# Patient Record
Sex: MALE | Race: BLACK OR AFRICAN AMERICAN | Employment: OTHER
[De-identification: names, ages, dates, MRNs, and addresses within clinical notes are randomized per-mention and may not be internally consistent; named-entity substitution may affect disease eponyms.]

---

## 2017-02-02 ENCOUNTER — TELEPHONE (OUTPATIENT)
Dept: FAMILY MEDICINE CLINIC | Facility: CLINIC | Age: 62
End: 2017-02-02

## 2017-02-06 ENCOUNTER — TELEPHONE (OUTPATIENT)
Dept: FAMILY MEDICINE CLINIC | Facility: CLINIC | Age: 62
End: 2017-02-06

## 2017-02-07 ENCOUNTER — CARE COORDINATION (OUTPATIENT)
Dept: CARE COORDINATION | Facility: CLINIC | Age: 62
End: 2017-02-07

## 2017-02-07 ENCOUNTER — OFFICE VISIT (OUTPATIENT)
Dept: FAMILY MEDICINE CLINIC | Facility: CLINIC | Age: 62
End: 2017-02-07

## 2017-02-07 VITALS — DIASTOLIC BLOOD PRESSURE: 80 MMHG | SYSTOLIC BLOOD PRESSURE: 120 MMHG

## 2017-02-07 DIAGNOSIS — M86.271 SUBACUTE OSTEOMYELITIS OF RIGHT FOOT (HCC): Primary | ICD-10-CM

## 2017-02-07 PROCEDURE — G8419 CALC BMI OUT NRM PARAM NOF/U: HCPCS | Performed by: FAMILY MEDICINE

## 2017-02-07 PROCEDURE — G8484 FLU IMMUNIZE NO ADMIN: HCPCS | Performed by: FAMILY MEDICINE

## 2017-02-07 PROCEDURE — G8598 ASA/ANTIPLAT THER USED: HCPCS | Performed by: FAMILY MEDICINE

## 2017-02-07 PROCEDURE — G8427 DOCREV CUR MEDS BY ELIG CLIN: HCPCS | Performed by: FAMILY MEDICINE

## 2017-02-07 PROCEDURE — 99214 OFFICE O/P EST MOD 30 MIN: CPT | Performed by: FAMILY MEDICINE

## 2017-02-07 PROCEDURE — 1036F TOBACCO NON-USER: CPT | Performed by: FAMILY MEDICINE

## 2017-02-07 PROCEDURE — 3017F COLORECTAL CA SCREEN DOC REV: CPT | Performed by: FAMILY MEDICINE

## 2017-02-07 RX ORDER — ONDANSETRON HYDROCHLORIDE 4 MG/5ML
SOLUTION ORAL
COMMUNITY
End: 2017-09-21

## 2017-02-07 RX ORDER — OXYCODONE HYDROCHLORIDE 5 MG/1
5 TABLET ORAL
COMMUNITY
End: 2017-04-25 | Stop reason: ALTCHOICE

## 2017-02-07 RX ORDER — CEFTRIAXONE 2 G/1
INJECTION, POWDER, FOR SOLUTION INTRAMUSCULAR; INTRAVENOUS
COMMUNITY
End: 2017-07-31

## 2017-02-07 RX ORDER — METOCLOPRAMIDE 5 MG/1
5 TABLET ORAL DAILY
COMMUNITY

## 2017-02-07 RX ORDER — CALCITRIOL 0.5 UG/1
0.5 CAPSULE, LIQUID FILLED ORAL DAILY
Qty: 30 CAPSULE | Refills: 5 | Status: SHIPPED | OUTPATIENT
Start: 2017-02-07

## 2017-02-07 ASSESSMENT — ENCOUNTER SYMPTOMS
EYE REDNESS: 0
SHORTNESS OF BREATH: 0
CONSTIPATION: 0
DIARRHEA: 0
VOMITING: 0
EYE DISCHARGE: 0
COUGH: 0
ABDOMINAL PAIN: 0
BLOOD IN STOOL: 0
TROUBLE SWALLOWING: 0
NAUSEA: 0

## 2017-02-08 ENCOUNTER — CARE COORDINATION (OUTPATIENT)
Dept: CARE COORDINATION | Facility: CLINIC | Age: 62
End: 2017-02-08

## 2017-02-09 ENCOUNTER — TELEPHONE (OUTPATIENT)
Dept: FAMILY MEDICINE CLINIC | Facility: CLINIC | Age: 62
End: 2017-02-09

## 2017-02-09 ENCOUNTER — CARE COORDINATION (OUTPATIENT)
Dept: CARE COORDINATION | Facility: CLINIC | Age: 62
End: 2017-02-09

## 2017-02-09 DIAGNOSIS — M86.271 SUBACUTE OSTEOMYELITIS OF RIGHT FOOT (HCC): Primary | ICD-10-CM

## 2017-02-10 ENCOUNTER — CARE COORDINATION (OUTPATIENT)
Dept: CARE COORDINATION | Facility: CLINIC | Age: 62
End: 2017-02-10

## 2017-02-13 ENCOUNTER — CARE COORDINATION (OUTPATIENT)
Dept: CARE COORDINATION | Facility: CLINIC | Age: 62
End: 2017-02-13

## 2017-02-24 ENCOUNTER — CARE COORDINATION (OUTPATIENT)
Dept: CARE COORDINATION | Facility: CLINIC | Age: 62
End: 2017-02-24

## 2017-02-27 ENCOUNTER — CARE COORDINATION (OUTPATIENT)
Dept: CARE COORDINATION | Facility: CLINIC | Age: 62
End: 2017-02-27

## 2017-02-28 ENCOUNTER — CARE COORDINATION (OUTPATIENT)
Dept: CARE COORDINATION | Facility: CLINIC | Age: 62
End: 2017-02-28

## 2017-03-01 ENCOUNTER — CARE COORDINATION (OUTPATIENT)
Dept: CARE COORDINATION | Facility: CLINIC | Age: 62
End: 2017-03-01

## 2017-03-02 ENCOUNTER — CARE COORDINATION (OUTPATIENT)
Dept: CARE COORDINATION | Facility: CLINIC | Age: 62
End: 2017-03-02

## 2017-03-14 ENCOUNTER — CARE COORDINATION (OUTPATIENT)
Dept: CARE COORDINATION | Age: 62
End: 2017-03-14

## 2017-03-14 RX ORDER — LIDOCAINE AND PRILOCAINE 25; 25 MG/G; MG/G
CREAM TOPICAL
Qty: 30 G | Refills: 5 | Status: SHIPPED | OUTPATIENT
Start: 2017-03-14 | End: 2018-01-01 | Stop reason: SDUPTHER

## 2017-03-15 ENCOUNTER — CARE COORDINATION (OUTPATIENT)
Dept: CARE COORDINATION | Age: 62
End: 2017-03-15

## 2017-03-27 ENCOUNTER — TELEPHONE (OUTPATIENT)
Dept: FAMILY MEDICINE CLINIC | Age: 62
End: 2017-03-27

## 2017-03-28 ENCOUNTER — OFFICE VISIT (OUTPATIENT)
Dept: FAMILY MEDICINE CLINIC | Age: 62
End: 2017-03-28
Payer: MEDICARE

## 2017-03-28 VITALS
BODY MASS INDEX: 30.08 KG/M2 | TEMPERATURE: 98.5 F | SYSTOLIC BLOOD PRESSURE: 130 MMHG | WEIGHT: 228 LBS | DIASTOLIC BLOOD PRESSURE: 74 MMHG

## 2017-03-28 DIAGNOSIS — J06.9 ACUTE URI: Primary | ICD-10-CM

## 2017-03-28 PROCEDURE — G8417 CALC BMI ABV UP PARAM F/U: HCPCS | Performed by: FAMILY MEDICINE

## 2017-03-28 PROCEDURE — G8427 DOCREV CUR MEDS BY ELIG CLIN: HCPCS | Performed by: FAMILY MEDICINE

## 2017-03-28 PROCEDURE — G8598 ASA/ANTIPLAT THER USED: HCPCS | Performed by: FAMILY MEDICINE

## 2017-03-28 PROCEDURE — 3017F COLORECTAL CA SCREEN DOC REV: CPT | Performed by: FAMILY MEDICINE

## 2017-03-28 PROCEDURE — 1036F TOBACCO NON-USER: CPT | Performed by: FAMILY MEDICINE

## 2017-03-28 PROCEDURE — G8484 FLU IMMUNIZE NO ADMIN: HCPCS | Performed by: FAMILY MEDICINE

## 2017-03-28 PROCEDURE — 99213 OFFICE O/P EST LOW 20 MIN: CPT | Performed by: FAMILY MEDICINE

## 2017-03-28 RX ORDER — CEPHALEXIN 500 MG/1
500 CAPSULE ORAL 2 TIMES DAILY
Qty: 14 CAPSULE | Refills: 0 | Status: SHIPPED | OUTPATIENT
Start: 2017-03-28 | End: 2017-04-04

## 2017-03-28 ASSESSMENT — ENCOUNTER SYMPTOMS
DIARRHEA: 0
SORE THROAT: 0
FACIAL SWELLING: 0
EYE DISCHARGE: 0
VOMITING: 0
TROUBLE SWALLOWING: 0
EYE REDNESS: 0
COUGH: 1

## 2017-03-29 ENCOUNTER — CARE COORDINATION (OUTPATIENT)
Dept: FAMILY MEDICINE CLINIC | Age: 62
End: 2017-03-29

## 2017-03-31 ENCOUNTER — CARE COORDINATION (OUTPATIENT)
Dept: FAMILY MEDICINE CLINIC | Age: 62
End: 2017-03-31

## 2017-04-07 ENCOUNTER — CARE COORDINATION (OUTPATIENT)
Dept: CARE COORDINATION | Age: 62
End: 2017-04-07

## 2017-04-25 ENCOUNTER — OFFICE VISIT (OUTPATIENT)
Dept: FAMILY MEDICINE CLINIC | Age: 62
End: 2017-04-25
Payer: MEDICARE

## 2017-04-25 VITALS — WEIGHT: 240 LBS | BODY MASS INDEX: 31.66 KG/M2 | DIASTOLIC BLOOD PRESSURE: 90 MMHG | SYSTOLIC BLOOD PRESSURE: 130 MMHG

## 2017-04-25 DIAGNOSIS — I10 ESSENTIAL HYPERTENSION: ICD-10-CM

## 2017-04-25 DIAGNOSIS — Z99.2 RENAL DIALYSIS STATUS (HCC): ICD-10-CM

## 2017-04-25 DIAGNOSIS — I50.22 CHRONIC SYSTOLIC CONGESTIVE HEART FAILURE (HCC): ICD-10-CM

## 2017-04-25 DIAGNOSIS — Z99.2 CHRONIC KIDNEY DISEASE REQUIRING CHRONIC DIALYSIS (HCC): ICD-10-CM

## 2017-04-25 DIAGNOSIS — E11.8 TYPE 2 DIABETES MELLITUS WITH COMPLICATION, WITH LONG-TERM CURRENT USE OF INSULIN (HCC): Primary | ICD-10-CM

## 2017-04-25 DIAGNOSIS — E78.00 HYPERCHOLESTEROLEMIA: ICD-10-CM

## 2017-04-25 DIAGNOSIS — N18.6 CHRONIC KIDNEY DISEASE REQUIRING CHRONIC DIALYSIS (HCC): ICD-10-CM

## 2017-04-25 DIAGNOSIS — N18.4 CHRONIC KIDNEY DISEASE (CKD), STAGE IV (SEVERE) (HCC): ICD-10-CM

## 2017-04-25 DIAGNOSIS — E11.21 DIABETIC NEPHROPATHY ASSOCIATED WITH TYPE 2 DIABETES MELLITUS (HCC): ICD-10-CM

## 2017-04-25 DIAGNOSIS — R79.89 ELEVATED PTHRP LEVEL: ICD-10-CM

## 2017-04-25 DIAGNOSIS — Z79.4 TYPE 2 DIABETES MELLITUS WITH COMPLICATION, WITH LONG-TERM CURRENT USE OF INSULIN (HCC): Primary | ICD-10-CM

## 2017-04-25 PROCEDURE — 99215 OFFICE O/P EST HI 40 MIN: CPT | Performed by: FAMILY MEDICINE

## 2017-04-25 PROCEDURE — 3017F COLORECTAL CA SCREEN DOC REV: CPT | Performed by: FAMILY MEDICINE

## 2017-04-25 PROCEDURE — G8417 CALC BMI ABV UP PARAM F/U: HCPCS | Performed by: FAMILY MEDICINE

## 2017-04-25 PROCEDURE — G8428 CUR MEDS NOT DOCUMENT: HCPCS | Performed by: FAMILY MEDICINE

## 2017-04-25 PROCEDURE — 1036F TOBACCO NON-USER: CPT | Performed by: FAMILY MEDICINE

## 2017-04-25 PROCEDURE — 3046F HEMOGLOBIN A1C LEVEL >9.0%: CPT | Performed by: FAMILY MEDICINE

## 2017-04-25 PROCEDURE — G8598 ASA/ANTIPLAT THER USED: HCPCS | Performed by: FAMILY MEDICINE

## 2017-04-25 RX ORDER — PREGABALIN 25 MG/1
25 CAPSULE ORAL DAILY
COMMUNITY
End: 2017-09-21

## 2017-04-25 RX ORDER — INSULIN GLARGINE 100 [IU]/ML
10 INJECTION, SOLUTION SUBCUTANEOUS NIGHTLY
Qty: 3 VIAL | Refills: 2 | Status: SHIPPED | OUTPATIENT
Start: 2017-04-25 | End: 2017-10-04 | Stop reason: SDUPTHER

## 2017-04-25 ASSESSMENT — ENCOUNTER SYMPTOMS
COUGH: 0
ABDOMINAL PAIN: 0
EYE DISCHARGE: 0
TROUBLE SWALLOWING: 0
FACIAL SWELLING: 0
DIARRHEA: 0
VOMITING: 0
SHORTNESS OF BREATH: 0
BLOOD IN STOOL: 0
CONSTIPATION: 0
NAUSEA: 0
EYE REDNESS: 0

## 2017-05-09 RX ORDER — ISOSORBIDE MONONITRATE 60 MG/1
60 TABLET, EXTENDED RELEASE ORAL DAILY
Qty: 30 TABLET | Refills: 5 | Status: SHIPPED | OUTPATIENT
Start: 2017-05-09

## 2017-05-17 ENCOUNTER — TELEPHONE (OUTPATIENT)
Dept: FAMILY MEDICINE CLINIC | Age: 62
End: 2017-05-17

## 2017-05-22 ENCOUNTER — APPOINTMENT (OUTPATIENT)
Dept: GENERAL RADIOLOGY | Age: 62
End: 2017-05-22
Payer: MEDICARE

## 2017-05-22 ENCOUNTER — HOSPITAL ENCOUNTER (EMERGENCY)
Age: 62
Discharge: HOME | End: 2017-05-22
Attending: EMERGENCY MEDICINE
Payer: MEDICARE

## 2017-05-22 VITALS
RESPIRATION RATE: 25 BRPM | SYSTOLIC BLOOD PRESSURE: 177 MMHG | OXYGEN SATURATION: 98 % | DIASTOLIC BLOOD PRESSURE: 98 MMHG | HEART RATE: 95 BPM | TEMPERATURE: 98.4 F

## 2017-05-22 DIAGNOSIS — Z99.2 ACUTE RENAL FAILURE ON DIALYSIS (HCC): ICD-10-CM

## 2017-05-22 DIAGNOSIS — N17.9 ACUTE RENAL FAILURE ON DIALYSIS (HCC): ICD-10-CM

## 2017-05-22 DIAGNOSIS — R06.02 SHORTNESS OF BREATH: Primary | ICD-10-CM

## 2017-05-22 DIAGNOSIS — I50.9 ACUTE CONGESTIVE HEART FAILURE, UNSPECIFIED CONGESTIVE HEART FAILURE TYPE: ICD-10-CM

## 2017-05-22 LAB
ABSOLUTE EOS #: 1.6 K/UL (ref 0–0.4)
ABSOLUTE LYMPH #: 1 K/UL (ref 0.9–2.5)
ABSOLUTE MONO #: 0.8 K/UL (ref 0–1)
ANION GAP SERPL CALCULATED.3IONS-SCNC: 20 MMOL/L (ref 9–17)
BASOPHILS # BLD: 0 %
BASOPHILS ABSOLUTE: 0 K/UL (ref 0–0.2)
BNP INTERPRETATION: ABNORMAL
BUN BLDV-MCNC: 59 MG/DL (ref 8–23)
BUN/CREAT BLD: 7 (ref 9–20)
CALCIUM SERPL-MCNC: 9.5 MG/DL (ref 8.6–10.4)
CHLORIDE BLD-SCNC: 90 MMOL/L (ref 98–107)
CO2: 22 MMOL/L (ref 20–31)
CREAT SERPL-MCNC: 8.95 MG/DL (ref 0.7–1.2)
DIFFERENTIAL TYPE: YES
EOSINOPHILS RELATIVE PERCENT: 12 %
GFR AFRICAN AMERICAN: 7 ML/MIN
GFR NON-AFRICAN AMERICAN: 6 ML/MIN
GFR SERPL CREATININE-BSD FRML MDRD: ABNORMAL ML/MIN/{1.73_M2}
GFR SERPL CREATININE-BSD FRML MDRD: ABNORMAL ML/MIN/{1.73_M2}
GLUCOSE BLD-MCNC: 491 MG/DL (ref 70–99)
HCT VFR BLD CALC: 32.1 % (ref 41–53)
HEMOGLOBIN: 10.8 G/DL (ref 13.5–17.5)
LYMPHOCYTES # BLD: 8 %
MCH RBC QN AUTO: 27.1 PG (ref 26–34)
MCHC RBC AUTO-ENTMCNC: 33.5 G/DL (ref 31–37)
MCV RBC AUTO: 80.8 FL (ref 80–100)
MONOCYTES # BLD: 6 %
PDW BLD-RTO: 16.2 % (ref 12.1–15.2)
PLATELET # BLD: 349 K/UL (ref 140–450)
PLATELET ESTIMATE: ABNORMAL
PMV BLD AUTO: ABNORMAL FL (ref 6–12)
POTASSIUM SERPL-SCNC: 5.4 MMOL/L (ref 3.7–5.3)
PRO-BNP: ABNORMAL PG/ML
RBC # BLD: 3.97 M/UL (ref 4.5–5.9)
RBC # BLD: ABNORMAL 10*6/UL
SEG NEUTROPHILS: 74 %
SEGMENTED NEUTROPHILS ABSOLUTE COUNT: 9.7 K/UL (ref 2.1–6.5)
SODIUM BLD-SCNC: 132 MMOL/L (ref 135–144)
TROPONIN INTERP: ABNORMAL
TROPONIN T: 0.14 NG/ML
WBC # BLD: 13.2 K/UL (ref 3.5–11)
WBC # BLD: ABNORMAL 10*3/UL

## 2017-05-22 PROCEDURE — 83880 ASSAY OF NATRIURETIC PEPTIDE: CPT

## 2017-05-22 PROCEDURE — 71010 XR CHEST LIMITED ONE VIEW: CPT

## 2017-05-22 PROCEDURE — 6360000002 HC RX W HCPCS

## 2017-05-22 PROCEDURE — 6370000000 HC RX 637 (ALT 250 FOR IP): Performed by: EMERGENCY MEDICINE

## 2017-05-22 PROCEDURE — 84484 ASSAY OF TROPONIN QUANT: CPT

## 2017-05-22 PROCEDURE — 85025 COMPLETE CBC W/AUTO DIFF WBC: CPT

## 2017-05-22 PROCEDURE — 93005 ELECTROCARDIOGRAM TRACING: CPT

## 2017-05-22 PROCEDURE — 99285 EMERGENCY DEPT VISIT HI MDM: CPT

## 2017-05-22 PROCEDURE — 80048 BASIC METABOLIC PNL TOTAL CA: CPT

## 2017-05-22 RX ORDER — ALBUTEROL SULFATE 2.5 MG/3ML
SOLUTION RESPIRATORY (INHALATION)
Status: COMPLETED
Start: 2017-05-22 | End: 2017-05-22

## 2017-05-22 RX ORDER — NITROGLYCERIN 0.4 MG/1
0.4 TABLET SUBLINGUAL EVERY 5 MIN PRN
Status: DISCONTINUED | OUTPATIENT
Start: 2017-05-22 | End: 2017-05-22 | Stop reason: HOSPADM

## 2017-05-22 RX ADMIN — Medication 0.4 MG: at 10:34

## 2017-05-22 RX ADMIN — NITROGLYCERIN 1 INCH: 20 OINTMENT TOPICAL at 10:35

## 2017-05-22 RX ADMIN — ALBUTEROL SULFATE: 2.5 SOLUTION RESPIRATORY (INHALATION) at 09:15

## 2017-05-22 RX ADMIN — Medication 0.4 MG: at 11:22

## 2017-05-23 ENCOUNTER — CARE COORDINATION (OUTPATIENT)
Dept: CARE COORDINATION | Age: 62
End: 2017-05-23

## 2017-05-25 ENCOUNTER — OFFICE VISIT (OUTPATIENT)
Dept: FAMILY MEDICINE CLINIC | Age: 62
End: 2017-05-25
Payer: MEDICARE

## 2017-05-25 VITALS
DIASTOLIC BLOOD PRESSURE: 90 MMHG | BODY MASS INDEX: 31.4 KG/M2 | SYSTOLIC BLOOD PRESSURE: 130 MMHG | HEART RATE: 80 BPM | WEIGHT: 238 LBS | OXYGEN SATURATION: 98 %

## 2017-05-25 DIAGNOSIS — R06.09 DYSPNEA ON EXERTION: Primary | ICD-10-CM

## 2017-05-25 DIAGNOSIS — R09.81 NASAL CONGESTION: ICD-10-CM

## 2017-05-25 LAB
EKG ATRIAL RATE: 109 BPM
EKG P AXIS: 61 DEGREES
EKG P-R INTERVAL: 144 MS
EKG Q-T INTERVAL: 362 MS
EKG QRS DURATION: 108 MS
EKG QTC CALCULATION (BAZETT): 487 MS
EKG R AXIS: -39 DEGREES
EKG T AXIS: 87 DEGREES
EKG VENTRICULAR RATE: 109 BPM

## 2017-05-25 PROCEDURE — G8417 CALC BMI ABV UP PARAM F/U: HCPCS | Performed by: FAMILY MEDICINE

## 2017-05-25 PROCEDURE — 99214 OFFICE O/P EST MOD 30 MIN: CPT | Performed by: FAMILY MEDICINE

## 2017-05-25 PROCEDURE — G8427 DOCREV CUR MEDS BY ELIG CLIN: HCPCS | Performed by: FAMILY MEDICINE

## 2017-05-25 PROCEDURE — 3017F COLORECTAL CA SCREEN DOC REV: CPT | Performed by: FAMILY MEDICINE

## 2017-05-25 PROCEDURE — G8598 ASA/ANTIPLAT THER USED: HCPCS | Performed by: FAMILY MEDICINE

## 2017-05-25 PROCEDURE — 1036F TOBACCO NON-USER: CPT | Performed by: FAMILY MEDICINE

## 2017-05-25 RX ORDER — FLUTICASONE PROPIONATE 50 MCG
1 SPRAY, SUSPENSION (ML) NASAL DAILY
Qty: 1 BOTTLE | Refills: 2 | Status: SHIPPED | OUTPATIENT
Start: 2017-05-25

## 2017-05-25 ASSESSMENT — ENCOUNTER SYMPTOMS
SPUTUM PRODUCTION: 1
VOMITING: 0
SORE THROAT: 0
HEMOPTYSIS: 1
WHEEZING: 0
SHORTNESS OF BREATH: 1

## 2017-05-25 ASSESSMENT — PATIENT HEALTH QUESTIONNAIRE - PHQ9
SUM OF ALL RESPONSES TO PHQ QUESTIONS 1-9: 2
1. LITTLE INTEREST OR PLEASURE IN DOING THINGS: 1
2. FEELING DOWN, DEPRESSED OR HOPELESS: 1
SUM OF ALL RESPONSES TO PHQ9 QUESTIONS 1 & 2: 2

## 2017-05-29 ASSESSMENT — ENCOUNTER SYMPTOMS
TROUBLE SWALLOWING: 0
COUGH: 0
CONSTIPATION: 0
EYE REDNESS: 0
DIARRHEA: 0
EYE DISCHARGE: 0
ABDOMINAL PAIN: 0
NAUSEA: 0
BLOOD IN STOOL: 0

## 2017-06-28 RX ORDER — OMEPRAZOLE 20 MG/1
20 CAPSULE, DELAYED RELEASE ORAL DAILY
Qty: 30 CAPSULE | Refills: 5 | Status: SHIPPED | OUTPATIENT
Start: 2017-06-28 | End: 2017-07-31

## 2017-07-27 ENCOUNTER — OFFICE VISIT (OUTPATIENT)
Dept: FAMILY MEDICINE CLINIC | Age: 62
End: 2017-07-27
Payer: MEDICARE

## 2017-07-27 VITALS — WEIGHT: 237 LBS | HEIGHT: 73 IN | BODY MASS INDEX: 31.41 KG/M2

## 2017-07-27 DIAGNOSIS — L25.5 CONTACT DERMATITIS DUE TO PLANT: Primary | ICD-10-CM

## 2017-07-27 PROCEDURE — 99213 OFFICE O/P EST LOW 20 MIN: CPT | Performed by: FAMILY MEDICINE

## 2017-07-27 PROCEDURE — G8417 CALC BMI ABV UP PARAM F/U: HCPCS | Performed by: FAMILY MEDICINE

## 2017-07-27 PROCEDURE — G8427 DOCREV CUR MEDS BY ELIG CLIN: HCPCS | Performed by: FAMILY MEDICINE

## 2017-07-27 PROCEDURE — 1036F TOBACCO NON-USER: CPT | Performed by: FAMILY MEDICINE

## 2017-07-27 PROCEDURE — G8598 ASA/ANTIPLAT THER USED: HCPCS | Performed by: FAMILY MEDICINE

## 2017-07-27 PROCEDURE — 3017F COLORECTAL CA SCREEN DOC REV: CPT | Performed by: FAMILY MEDICINE

## 2017-07-27 RX ORDER — TRIAMCINOLONE ACETONIDE 40 MG/ML
40 INJECTION, SUSPENSION INTRA-ARTICULAR; INTRAMUSCULAR ONCE
Status: COMPLETED | OUTPATIENT
Start: 2017-07-27 | End: 2017-07-27

## 2017-07-27 RX ADMIN — TRIAMCINOLONE ACETONIDE 40 MG: 40 INJECTION, SUSPENSION INTRA-ARTICULAR; INTRAMUSCULAR at 12:58

## 2017-07-31 ENCOUNTER — HOSPITAL ENCOUNTER (EMERGENCY)
Age: 62
Discharge: HOME | End: 2017-07-31
Attending: FAMILY MEDICINE
Payer: MEDICARE

## 2017-07-31 ENCOUNTER — APPOINTMENT (OUTPATIENT)
Dept: GENERAL RADIOLOGY | Age: 62
End: 2017-07-31
Payer: MEDICARE

## 2017-07-31 VITALS
SYSTOLIC BLOOD PRESSURE: 154 MMHG | RESPIRATION RATE: 18 BRPM | TEMPERATURE: 100.4 F | HEART RATE: 98 BPM | DIASTOLIC BLOOD PRESSURE: 80 MMHG | OXYGEN SATURATION: 98 %

## 2017-07-31 DIAGNOSIS — S92.354A CLOSED NONDISPLACED FRACTURE OF FIFTH METATARSAL BONE OF RIGHT FOOT, INITIAL ENCOUNTER: Primary | ICD-10-CM

## 2017-07-31 LAB
ABSOLUTE EOS #: 0.1 K/UL (ref 0–0.4)
ABSOLUTE EOS #: NORMAL K/UL (ref 0–0.4)
ABSOLUTE LYMPH #: 0.9 K/UL (ref 0.9–2.5)
ABSOLUTE LYMPH #: NORMAL K/UL (ref 0.9–2.5)
ABSOLUTE MONO #: 1.2 K/UL (ref 0–1)
ABSOLUTE MONO #: NORMAL K/UL (ref 0–1)
ANION GAP SERPL CALCULATED.3IONS-SCNC: 18 MMOL/L (ref 9–17)
ANION GAP SERPL CALCULATED.3IONS-SCNC: NORMAL MMOL/L (ref 8–16)
BASOPHILS # BLD: 0 %
BASOPHILS # BLD: NORMAL %
BASOPHILS ABSOLUTE: 0 K/UL (ref 0–0.2)
BASOPHILS ABSOLUTE: NORMAL K/UL (ref 0–0.2)
BUN BLDV-MCNC: 22 MG/DL (ref 8–23)
BUN BLDV-MCNC: NORMAL MG/DL (ref 8–23)
BUN/CREAT BLD: 3 (ref 9–20)
BUN/CREAT BLD: NORMAL (ref 9–20)
CALCIUM SERPL-MCNC: 9.2 MG/DL (ref 8.6–10.4)
CALCIUM SERPL-MCNC: NORMAL MG/DL (ref 8.6–10.4)
CHLORIDE BLD-SCNC: 87 MMOL/L (ref 98–107)
CHLORIDE BLD-SCNC: NORMAL MMOL/L (ref 98–107)
CO2: 25 MMOL/L (ref 20–31)
CO2: NORMAL MMOL/L (ref 20–31)
CREAT SERPL-MCNC: 6.5 MG/DL (ref 0.7–1.2)
CREAT SERPL-MCNC: NORMAL MG/DL (ref 0.7–1.2)
DIFFERENTIAL TYPE: NORMAL
DIFFERENTIAL TYPE: YES
EOSINOPHILS RELATIVE PERCENT: 1 %
EOSINOPHILS RELATIVE PERCENT: NORMAL %
GFR AFRICAN AMERICAN: 11 ML/MIN
GFR AFRICAN AMERICAN: NORMAL ML/MIN
GFR NON-AFRICAN AMERICAN: 9 ML/MIN
GFR NON-AFRICAN AMERICAN: NORMAL ML/MIN
GFR SERPL CREATININE-BSD FRML MDRD: ABNORMAL ML/MIN/{1.73_M2}
GFR SERPL CREATININE-BSD FRML MDRD: ABNORMAL ML/MIN/{1.73_M2}
GFR SERPL CREATININE-BSD FRML MDRD: NORMAL ML/MIN/{1.73_M2}
GFR SERPL CREATININE-BSD FRML MDRD: NORMAL ML/MIN/{1.73_M2}
GLUCOSE BLD-MCNC: 253 MG/DL (ref 70–99)
GLUCOSE BLD-MCNC: NORMAL MG/DL (ref 70–99)
HCT VFR BLD CALC: 31.3 % (ref 41–53)
HCT VFR BLD CALC: NORMAL % (ref 41–53)
HEMOGLOBIN: 10.1 G/DL (ref 13.5–17.5)
HEMOGLOBIN: NORMAL G/DL (ref 13.5–17.5)
LYMPHOCYTES # BLD: 7 %
LYMPHOCYTES # BLD: NORMAL %
MCH RBC QN AUTO: 26 PG (ref 26–34)
MCH RBC QN AUTO: NORMAL PG (ref 26–34)
MCHC RBC AUTO-ENTMCNC: 32.3 G/DL (ref 31–37)
MCHC RBC AUTO-ENTMCNC: NORMAL G/DL (ref 31–37)
MCV RBC AUTO: 80.4 FL (ref 80–100)
MCV RBC AUTO: NORMAL FL (ref 80–100)
MONOCYTES # BLD: 10 %
MONOCYTES # BLD: NORMAL %
PDW BLD-RTO: 17.9 % (ref 12.1–15.2)
PDW BLD-RTO: NORMAL % (ref 12.1–15.2)
PLATELET # BLD: 403 K/UL (ref 140–450)
PLATELET # BLD: NORMAL K/UL (ref 140–450)
PLATELET ESTIMATE: ABNORMAL
PLATELET ESTIMATE: NORMAL
PMV BLD AUTO: ABNORMAL FL (ref 6–12)
PMV BLD AUTO: NORMAL FL (ref 6–12)
POTASSIUM SERPL-SCNC: 3.8 MMOL/L (ref 3.7–5.3)
POTASSIUM SERPL-SCNC: NORMAL MMOL/L (ref 3.7–5.3)
RBC # BLD: 3.89 M/UL (ref 4.5–5.9)
RBC # BLD: ABNORMAL 10*6/UL
RBC # BLD: NORMAL 10*6/UL
RBC # BLD: NORMAL M/UL (ref 4.5–5.9)
SEG NEUTROPHILS: 82 %
SEG NEUTROPHILS: NORMAL %
SEGMENTED NEUTROPHILS ABSOLUTE COUNT: 9.7 K/UL (ref 2.1–6.5)
SEGMENTED NEUTROPHILS ABSOLUTE COUNT: NORMAL K/UL (ref 2.1–6.5)
SODIUM BLD-SCNC: 130 MMOL/L (ref 135–144)
SODIUM BLD-SCNC: NORMAL MMOL/L (ref 135–144)
WBC # BLD: 11.9 K/UL (ref 3.5–11)
WBC # BLD: ABNORMAL 10*3/UL
WBC # BLD: NORMAL 10*3/UL
WBC # BLD: NORMAL K/UL (ref 3.5–11)

## 2017-07-31 PROCEDURE — 85025 COMPLETE CBC W/AUTO DIFF WBC: CPT

## 2017-07-31 PROCEDURE — 73630 X-RAY EXAM OF FOOT: CPT

## 2017-07-31 PROCEDURE — 36415 COLL VENOUS BLD VENIPUNCTURE: CPT

## 2017-07-31 PROCEDURE — 80048 BASIC METABOLIC PNL TOTAL CA: CPT

## 2017-07-31 PROCEDURE — 99283 EMERGENCY DEPT VISIT LOW MDM: CPT

## 2017-07-31 PROCEDURE — 6370000000 HC RX 637 (ALT 250 FOR IP): Performed by: FAMILY MEDICINE

## 2017-07-31 RX ORDER — HYDROCODONE BITARTRATE AND ACETAMINOPHEN 5; 325 MG/1; MG/1
1 TABLET ORAL ONCE
Status: COMPLETED | OUTPATIENT
Start: 2017-07-31 | End: 2017-07-31

## 2017-07-31 RX ADMIN — HYDROCODONE BITARTRATE AND ACETAMINOPHEN 1 TABLET: 5; 325 TABLET ORAL at 21:04

## 2017-07-31 RX ADMIN — HYDROCODONE BITARTRATE AND ACETAMINOPHEN 1 TABLET: 5; 325 TABLET ORAL at 21:11

## 2017-07-31 ASSESSMENT — PAIN DESCRIPTION - DESCRIPTORS: DESCRIPTORS: THROBBING;SORE

## 2017-07-31 ASSESSMENT — PAIN DESCRIPTION - FREQUENCY: FREQUENCY: CONTINUOUS

## 2017-07-31 ASSESSMENT — PAIN SCALES - GENERAL
PAINLEVEL_OUTOF10: 10

## 2017-07-31 ASSESSMENT — PAIN DESCRIPTION - PAIN TYPE: TYPE: ACUTE PAIN

## 2017-07-31 ASSESSMENT — PAIN DESCRIPTION - ONSET: ONSET: ON-GOING

## 2017-07-31 ASSESSMENT — PAIN DESCRIPTION - LOCATION: LOCATION: FOOT

## 2017-07-31 ASSESSMENT — PAIN DESCRIPTION - ORIENTATION: ORIENTATION: RIGHT

## 2017-07-31 ASSESSMENT — PAIN DESCRIPTION - PROGRESSION: CLINICAL_PROGRESSION: NOT CHANGED

## 2017-08-02 ENCOUNTER — HOSPITAL ENCOUNTER (EMERGENCY)
Age: 62
Discharge: HOME | End: 2017-08-02
Attending: FAMILY MEDICINE
Payer: MEDICARE

## 2017-08-02 ENCOUNTER — APPOINTMENT (OUTPATIENT)
Dept: VASCULAR LAB | Age: 62
End: 2017-08-02
Payer: MEDICARE

## 2017-08-02 ENCOUNTER — APPOINTMENT (OUTPATIENT)
Dept: CT IMAGING | Age: 62
End: 2017-08-02
Payer: MEDICARE

## 2017-08-02 ENCOUNTER — CARE COORDINATION (OUTPATIENT)
Dept: CARE COORDINATION | Age: 62
End: 2017-08-02

## 2017-08-02 VITALS
TEMPERATURE: 98.6 F | SYSTOLIC BLOOD PRESSURE: 149 MMHG | RESPIRATION RATE: 19 BRPM | DIASTOLIC BLOOD PRESSURE: 67 MMHG | HEART RATE: 95 BPM | OXYGEN SATURATION: 99 %

## 2017-08-02 DIAGNOSIS — N18.5 CHRONIC KIDNEY DISEASE (CKD), STAGE 5: ICD-10-CM

## 2017-08-02 DIAGNOSIS — M86.9 OSTEOMYELITIS OF ANKLE AND FOOT (HCC): Primary | ICD-10-CM

## 2017-08-02 LAB
ABSOLUTE EOS #: 0 K/UL (ref 0–0.4)
ABSOLUTE LYMPH #: 0.5 K/UL (ref 0.9–2.5)
ABSOLUTE MONO #: 1.6 K/UL (ref 0–1)
ALBUMIN SERPL-MCNC: 2.9 G/DL (ref 3.5–5.2)
ALBUMIN/GLOBULIN RATIO: ABNORMAL (ref 1–2.5)
ALP BLD-CCNC: 75 U/L (ref 40–129)
ALT SERPL-CCNC: 16 U/L (ref 5–41)
ANION GAP SERPL CALCULATED.3IONS-SCNC: 21 MMOL/L (ref 9–17)
AST SERPL-CCNC: 31 U/L
BASOPHILS # BLD: 0 %
BASOPHILS ABSOLUTE: 0 K/UL (ref 0–0.2)
BILIRUB SERPL-MCNC: 0.74 MG/DL (ref 0.3–1.2)
BUN BLDV-MCNC: 47 MG/DL (ref 8–23)
BUN/CREAT BLD: 4 (ref 9–20)
CALCIUM SERPL-MCNC: 8.8 MG/DL (ref 8.6–10.4)
CHLORIDE BLD-SCNC: 83 MMOL/L (ref 98–107)
CHP ED QC CHECK: 240
CO2: 21 MMOL/L (ref 20–31)
CREAT SERPL-MCNC: 10.58 MG/DL (ref 0.7–1.2)
DIFFERENTIAL TYPE: YES
EOSINOPHILS RELATIVE PERCENT: 0 %
GFR AFRICAN AMERICAN: 6 ML/MIN
GFR NON-AFRICAN AMERICAN: 5 ML/MIN
GFR SERPL CREATININE-BSD FRML MDRD: ABNORMAL ML/MIN/{1.73_M2}
GFR SERPL CREATININE-BSD FRML MDRD: ABNORMAL ML/MIN/{1.73_M2}
GLUCOSE BLD-MCNC: 240 MG/DL (ref 65–99)
GLUCOSE BLD-MCNC: 489 MG/DL (ref 70–99)
HCT VFR BLD CALC: 28.8 % (ref 41–53)
HEMOGLOBIN: 9.4 G/DL (ref 13.5–17.5)
LYMPHOCYTES # BLD: 4 %
MCH RBC QN AUTO: 26.2 PG (ref 26–34)
MCHC RBC AUTO-ENTMCNC: 32.6 G/DL (ref 31–37)
MCV RBC AUTO: 80.3 FL (ref 80–100)
MONOCYTES # BLD: 12 %
PDW BLD-RTO: 18.4 % (ref 12.1–15.2)
PLATELET # BLD: 384 K/UL (ref 140–450)
PLATELET ESTIMATE: ABNORMAL
PMV BLD AUTO: ABNORMAL FL (ref 6–12)
POTASSIUM SERPL-SCNC: 5.1 MMOL/L (ref 3.7–5.3)
RBC # BLD: 3.58 M/UL (ref 4.5–5.9)
RBC # BLD: ABNORMAL 10*6/UL
SEDIMENTATION RATE, ERYTHROCYTE: 130 MM (ref 0–20)
SEG NEUTROPHILS: 84 %
SEGMENTED NEUTROPHILS ABSOLUTE COUNT: 10.7 K/UL (ref 2.1–6.5)
SODIUM BLD-SCNC: 125 MMOL/L (ref 135–144)
TOTAL PROTEIN: 8.7 G/DL (ref 6.4–8.3)
TROPONIN INTERP: ABNORMAL
TROPONIN T: 0.13 NG/ML
WBC # BLD: 12.8 K/UL (ref 3.5–11)
WBC # BLD: ABNORMAL 10*3/UL

## 2017-08-02 PROCEDURE — 87186 SC STD MICRODIL/AGAR DIL: CPT

## 2017-08-02 PROCEDURE — 85025 COMPLETE CBC W/AUTO DIFF WBC: CPT

## 2017-08-02 PROCEDURE — 80053 COMPREHEN METABOLIC PANEL: CPT

## 2017-08-02 PROCEDURE — 73700 CT LOWER EXTREMITY W/O DYE: CPT

## 2017-08-02 PROCEDURE — 99285 EMERGENCY DEPT VISIT HI MDM: CPT

## 2017-08-02 PROCEDURE — 87040 BLOOD CULTURE FOR BACTERIA: CPT

## 2017-08-02 PROCEDURE — 36415 COLL VENOUS BLD VENIPUNCTURE: CPT

## 2017-08-02 PROCEDURE — 96365 THER/PROPH/DIAG IV INF INIT: CPT

## 2017-08-02 PROCEDURE — 96375 TX/PRO/DX INJ NEW DRUG ADDON: CPT

## 2017-08-02 PROCEDURE — 82947 ASSAY GLUCOSE BLOOD QUANT: CPT

## 2017-08-02 PROCEDURE — 87147 CULTURE TYPE IMMUNOLOGIC: CPT

## 2017-08-02 PROCEDURE — 87205 SMEAR GRAM STAIN: CPT

## 2017-08-02 PROCEDURE — 2500000003 HC RX 250 WO HCPCS: Performed by: FAMILY MEDICINE

## 2017-08-02 PROCEDURE — 93005 ELECTROCARDIOGRAM TRACING: CPT

## 2017-08-02 PROCEDURE — 2580000003 HC RX 258: Performed by: FAMILY MEDICINE

## 2017-08-02 PROCEDURE — 6370000000 HC RX 637 (ALT 250 FOR IP): Performed by: FAMILY MEDICINE

## 2017-08-02 PROCEDURE — 93971 EXTREMITY STUDY: CPT

## 2017-08-02 PROCEDURE — 86403 PARTICLE AGGLUT ANTBDY SCRN: CPT

## 2017-08-02 PROCEDURE — 84484 ASSAY OF TROPONIN QUANT: CPT

## 2017-08-02 PROCEDURE — 85651 RBC SED RATE NONAUTOMATED: CPT

## 2017-08-02 RX ORDER — HYDROCODONE BITARTRATE AND ACETAMINOPHEN 5; 325 MG/1; MG/1
1 TABLET ORAL EVERY 4 HOURS PRN
Qty: 20 TABLET | Refills: 0 | Status: SHIPPED | OUTPATIENT
Start: 2017-08-02 | End: 2017-09-21

## 2017-08-02 RX ORDER — ACETAMINOPHEN 325 MG/1
650 TABLET ORAL ONCE
Status: COMPLETED | OUTPATIENT
Start: 2017-08-02 | End: 2017-08-02

## 2017-08-02 RX ORDER — CLINDAMYCIN PHOSPHATE 900 MG/50ML
900 INJECTION INTRAVENOUS ONCE
Status: COMPLETED | OUTPATIENT
Start: 2017-08-02 | End: 2017-08-02

## 2017-08-02 RX ORDER — CLINDAMYCIN HYDROCHLORIDE 300 MG/1
600 CAPSULE ORAL 3 TIMES DAILY
Qty: 60 CAPSULE | Refills: 0 | Status: SHIPPED | OUTPATIENT
Start: 2017-08-02 | End: 2017-08-12

## 2017-08-02 RX ORDER — SODIUM CHLORIDE 9 MG/ML
INJECTION, SOLUTION INTRAVENOUS CONTINUOUS
Status: DISCONTINUED | OUTPATIENT
Start: 2017-08-02 | End: 2017-08-02 | Stop reason: HOSPADM

## 2017-08-02 ASSESSMENT — PAIN DESCRIPTION - PROGRESSION: CLINICAL_PROGRESSION: GRADUALLY WORSENING

## 2017-08-02 ASSESSMENT — PAIN DESCRIPTION - LOCATION: LOCATION: FOOT

## 2017-08-02 ASSESSMENT — PAIN DESCRIPTION - PAIN TYPE: TYPE: ACUTE PAIN

## 2017-08-02 ASSESSMENT — PAIN DESCRIPTION - ORIENTATION: ORIENTATION: RIGHT

## 2017-08-02 ASSESSMENT — PAIN SCALES - GENERAL
PAINLEVEL_OUTOF10: 8
PAINLEVEL_OUTOF10: 5

## 2017-08-02 ASSESSMENT — PAIN DESCRIPTION - DESCRIPTORS: DESCRIPTORS: CONSTANT

## 2017-08-02 ASSESSMENT — PAIN DESCRIPTION - FREQUENCY: FREQUENCY: CONTINUOUS

## 2017-08-03 ENCOUNTER — CARE COORDINATION (OUTPATIENT)
Dept: CARE COORDINATION | Age: 62
End: 2017-08-03

## 2017-08-03 LAB
EKG ATRIAL RATE: 97 BPM
EKG P AXIS: 64 DEGREES
EKG P-R INTERVAL: 152 MS
EKG Q-T INTERVAL: 372 MS
EKG QRS DURATION: 108 MS
EKG QTC CALCULATION (BAZETT): 472 MS
EKG R AXIS: 6 DEGREES
EKG T AXIS: -24 DEGREES
EKG VENTRICULAR RATE: 97 BPM

## 2017-08-04 LAB
CULTURE: ABNORMAL
Lab: ABNORMAL
Lab: ABNORMAL
ORGANISM: ABNORMAL
SPECIMEN DESCRIPTION: ABNORMAL
STATUS: ABNORMAL
STATUS: ABNORMAL

## 2017-08-06 ENCOUNTER — HOSPITAL ENCOUNTER (EMERGENCY)
Age: 62
Discharge: ANOTHER HOSPITAL | End: 2017-08-06
Attending: EMERGENCY MEDICINE
Payer: MEDICARE

## 2017-08-06 VITALS
BODY MASS INDEX: 31.41 KG/M2 | DIASTOLIC BLOOD PRESSURE: 59 MMHG | SYSTOLIC BLOOD PRESSURE: 122 MMHG | HEIGHT: 73 IN | HEART RATE: 86 BPM | TEMPERATURE: 100 F | WEIGHT: 237 LBS | RESPIRATION RATE: 20 BRPM | OXYGEN SATURATION: 92 %

## 2017-08-06 DIAGNOSIS — R73.9 HYPERGLYCEMIA: Primary | ICD-10-CM

## 2017-08-06 DIAGNOSIS — M72.6 NECROTIZING FASCIITIS (HCC): ICD-10-CM

## 2017-08-06 LAB
ABSOLUTE EOS #: 0 K/UL (ref 0–0.4)
ABSOLUTE LYMPH #: 0.7 K/UL (ref 0.9–2.5)
ABSOLUTE MONO #: 1.9 K/UL (ref 0–1)
ANION GAP SERPL CALCULATED.3IONS-SCNC: 20 MMOL/L (ref 9–17)
BASOPHILS # BLD: 0 %
BASOPHILS ABSOLUTE: 0 K/UL (ref 0–0.2)
BUN BLDV-MCNC: 50 MG/DL (ref 8–23)
BUN/CREAT BLD: 6 (ref 9–20)
CALCIUM SERPL-MCNC: 9.1 MG/DL (ref 8.6–10.4)
CHLORIDE BLD-SCNC: 79 MMOL/L (ref 98–107)
CO2: 24 MMOL/L (ref 20–31)
CREAT SERPL-MCNC: 8.07 MG/DL (ref 0.7–1.2)
DIFFERENTIAL TYPE: YES
EOSINOPHILS RELATIVE PERCENT: 0 %
GFR AFRICAN AMERICAN: 8 ML/MIN
GFR NON-AFRICAN AMERICAN: 7 ML/MIN
GFR SERPL CREATININE-BSD FRML MDRD: ABNORMAL ML/MIN/{1.73_M2}
GFR SERPL CREATININE-BSD FRML MDRD: ABNORMAL ML/MIN/{1.73_M2}
GLUCOSE BLD-MCNC: 598 MG/DL (ref 65–99)
GLUCOSE BLD-MCNC: 695 MG/DL (ref 70–99)
HCT VFR BLD CALC: 28.8 % (ref 41–53)
HEMOGLOBIN: 9.2 G/DL (ref 13.5–17.5)
LYMPHOCYTES # BLD: 4 %
MCH RBC QN AUTO: 25.6 PG (ref 26–34)
MCHC RBC AUTO-ENTMCNC: 31.9 G/DL (ref 31–37)
MCV RBC AUTO: 80.1 FL (ref 80–100)
MONOCYTES # BLD: 12 %
PDW BLD-RTO: 18.8 % (ref 12.1–15.2)
PLATELET # BLD: 426 K/UL (ref 140–450)
PLATELET ESTIMATE: ABNORMAL
PMV BLD AUTO: ABNORMAL FL (ref 6–12)
POTASSIUM SERPL-SCNC: 4.2 MMOL/L (ref 3.7–5.3)
RBC # BLD: 3.6 M/UL (ref 4.5–5.9)
RBC # BLD: ABNORMAL 10*6/UL
SEG NEUTROPHILS: 84 %
SEGMENTED NEUTROPHILS ABSOLUTE COUNT: 13.3 K/UL (ref 2.1–6.5)
SODIUM BLD-SCNC: 123 MMOL/L (ref 135–144)
WBC # BLD: 16 K/UL (ref 3.5–11)
WBC # BLD: ABNORMAL 10*3/UL

## 2017-08-06 PROCEDURE — 87040 BLOOD CULTURE FOR BACTERIA: CPT

## 2017-08-06 PROCEDURE — 82947 ASSAY GLUCOSE BLOOD QUANT: CPT

## 2017-08-06 PROCEDURE — 2580000003 HC RX 258: Performed by: EMERGENCY MEDICINE

## 2017-08-06 PROCEDURE — 6370000000 HC RX 637 (ALT 250 FOR IP): Performed by: EMERGENCY MEDICINE

## 2017-08-06 PROCEDURE — 6360000002 HC RX W HCPCS: Performed by: EMERGENCY MEDICINE

## 2017-08-06 PROCEDURE — 85025 COMPLETE CBC W/AUTO DIFF WBC: CPT

## 2017-08-06 PROCEDURE — 80048 BASIC METABOLIC PNL TOTAL CA: CPT

## 2017-08-06 PROCEDURE — 87185 SC STD ENZYME DETCJ PER NZM: CPT

## 2017-08-06 PROCEDURE — 96365 THER/PROPH/DIAG IV INF INIT: CPT

## 2017-08-06 PROCEDURE — 99284 EMERGENCY DEPT VISIT MOD MDM: CPT

## 2017-08-06 PROCEDURE — 87076 CULTURE ANAEROBE IDENT EACH: CPT

## 2017-08-06 PROCEDURE — 36415 COLL VENOUS BLD VENIPUNCTURE: CPT

## 2017-08-06 PROCEDURE — 96375 TX/PRO/DX INJ NEW DRUG ADDON: CPT

## 2017-08-06 PROCEDURE — 87205 SMEAR GRAM STAIN: CPT

## 2017-08-06 PROCEDURE — 29540 STRAPPING ANKLE &/FOOT: CPT

## 2017-08-06 RX ORDER — ONDANSETRON 2 MG/ML
4 INJECTION INTRAMUSCULAR; INTRAVENOUS ONCE
Status: COMPLETED | OUTPATIENT
Start: 2017-08-06 | End: 2017-08-06

## 2017-08-06 RX ORDER — MORPHINE SULFATE 2 MG/ML
2 INJECTION, SOLUTION INTRAMUSCULAR; INTRAVENOUS ONCE
Status: COMPLETED | OUTPATIENT
Start: 2017-08-06 | End: 2017-08-06

## 2017-08-06 RX ORDER — ACETAMINOPHEN 325 MG/1
650 TABLET ORAL ONCE
Status: COMPLETED | OUTPATIENT
Start: 2017-08-06 | End: 2017-08-06

## 2017-08-06 RX ADMIN — INSULIN HUMAN 10 UNITS: 100 INJECTION, SOLUTION PARENTERAL at 15:17

## 2017-08-06 RX ADMIN — AMPICILLIN SODIUM AND SULBACTAM SODIUM 1.5 G: 1; .5 INJECTION, POWDER, FOR SOLUTION INTRAMUSCULAR; INTRAVENOUS at 14:53

## 2017-08-06 RX ADMIN — ONDANSETRON 4 MG: 2 INJECTION INTRAMUSCULAR; INTRAVENOUS at 15:16

## 2017-08-06 RX ADMIN — ACETAMINOPHEN 650 MG: 325 TABLET, FILM COATED ORAL at 14:53

## 2017-08-06 RX ADMIN — MORPHINE SULFATE 2 MG: 2 INJECTION, SOLUTION INTRAMUSCULAR; INTRAVENOUS at 15:15

## 2017-08-06 ASSESSMENT — ENCOUNTER SYMPTOMS
CONSTIPATION: 0
WHEEZING: 0
GASTROINTESTINAL NEGATIVE: 1
COUGH: 0
NAUSEA: 0
ABDOMINAL PAIN: 0
COLOR CHANGE: 0
RESPIRATORY NEGATIVE: 1
TROUBLE SWALLOWING: 0
ALLERGIC/IMMUNOLOGIC NEGATIVE: 1
EYE PAIN: 0
EYES NEGATIVE: 1
VOMITING: 0
DIARRHEA: 0
SHORTNESS OF BREATH: 0
ABDOMINAL DISTENTION: 0
EYE REDNESS: 0
BACK PAIN: 0
FACIAL SWELLING: 0

## 2017-08-06 ASSESSMENT — PAIN DESCRIPTION - LOCATION: LOCATION: FOOT

## 2017-08-06 ASSESSMENT — PAIN DESCRIPTION - DESCRIPTORS: DESCRIPTORS: SHOOTING

## 2017-08-06 ASSESSMENT — PAIN SCALES - GENERAL
PAINLEVEL_OUTOF10: 10
PAINLEVEL_OUTOF10: 10
PAINLEVEL_OUTOF10: 2
PAINLEVEL_OUTOF10: 7

## 2017-08-06 ASSESSMENT — PAIN DESCRIPTION - ORIENTATION: ORIENTATION: RIGHT

## 2017-08-06 ASSESSMENT — PAIN DESCRIPTION - ONSET: ONSET: SUDDEN

## 2017-08-06 ASSESSMENT — PAIN DESCRIPTION - PAIN TYPE: TYPE: ACUTE PAIN

## 2017-08-06 ASSESSMENT — PAIN DESCRIPTION - PROGRESSION: CLINICAL_PROGRESSION: NOT CHANGED

## 2017-08-06 ASSESSMENT — PAIN DESCRIPTION - FREQUENCY: FREQUENCY: OTHER (COMMENT)

## 2017-08-06 NOTE — ED PROVIDER NOTES
sounds normal. No stridor. No respiratory distress. He has no wheezes. He has no rales. He exhibits no tenderness. Abdominal: Soft. Bowel sounds are normal. He exhibits no distension and no mass. There is no tenderness. There is no rebound and no guarding. Musculoskeletal:   Wound vac noted on right foot; foul smelling discharge noted from the ulcer in foot   Lymphadenopathy:     He has no cervical adenopathy. Neurological: He is alert and oriented to person, place, and time. He has normal reflexes. He displays normal reflexes. No cranial nerve deficit. He exhibits normal muscle tone. Coordination normal.   Skin: He is not diaphoretic. Psychiatric: He has a normal mood and affect. His behavior is normal. Judgment and thought content normal.   Nursing note and vitals reviewed. Procedures    MDM  Number of Diagnoses or Management Options  Hyperglycemia:   Necrotizing fasciitis [facility]):   Diagnosis management comments: Patient declined admission on 8/2 but is ok with being transferred now.  He does have a low grade fever       Amount and/or Complexity of Data Reviewed  Clinical lab tests: ordered and reviewed  Discuss the patient with other providers: yes    Risk of Complications, Morbidity, and/or Mortality  Presenting problems: moderate  Diagnostic procedures: moderate  Management options: moderate    Patient Progress  Patient progress: stable      ED Course       Labs  Labs Reviewed   CBC WITH AUTO DIFFERENTIAL - Abnormal; Notable for the following:        Result Value    WBC 16.0 (*)     RBC 3.60 (*)     Hemoglobin 9.2 (*)     Hematocrit 28.8 (*)     MCH 25.6 (*)     RDW 18.8 (*)     Segs Absolute 13.30 (*)     Absolute Lymph # 0.70 (*)     Absolute Mono # 1.90 (*)     All other components within normal limits   BASIC METABOLIC PANEL - Abnormal; Notable for the following:     Glucose 695 (*)     BUN 50 (*)     CREATININE 8.07 (*)     Bun/Cre Ratio 6 (*)     Sodium 123 (*)     Chloride 79 (*)     Anion Gap 20 (*)     GFR Non- 7 (*)     GFR  8 (*)     All other components within normal limits   CULTURE BLOOD #1   CULTURE BLOOD #2       Radiology      EKG Interpretation.        Frances Gasca MD  08/06/17 [id number]

## 2017-08-06 NOTE — ED TRIAGE NOTES
Patient arrives via [facility] with c/o increased right foot pain. Informs nurse that his foot is infected and broke. Note that patient has a wound vac on. Right foot has a foul odor. Patient uncooperative with assessment and med-recon.

## 2017-08-06 NOTE — ED NOTES
Ems personnel inform this nurse that patient does not have any assistance at home. Patient was non-weight bearing to right leg when they picked him today. Patient informs EMS personnel that he does not have any transport to dialysis in the morning with.       Adrian Del Toro RN  08/06/17 [id number]

## 2017-08-08 ENCOUNTER — CARE COORDINATION (OUTPATIENT)
Dept: CARE COORDINATION | Age: 62
End: 2017-08-08

## 2017-08-10 LAB
CULTURE: ABNORMAL
Lab: ABNORMAL
SPECIMEN DESCRIPTION: ABNORMAL
SPECIMEN DESCRIPTION: ABNORMAL
STATUS: ABNORMAL

## 2017-08-12 LAB
CULTURE: NORMAL
Lab: NORMAL
SPECIMEN DESCRIPTION: NORMAL
STATUS: NORMAL

## 2017-09-21 ENCOUNTER — OFFICE VISIT (OUTPATIENT)
Dept: FAMILY MEDICINE CLINIC | Age: 62
End: 2017-09-21
Payer: MEDICARE

## 2017-09-21 VITALS — WEIGHT: 228 LBS | SYSTOLIC BLOOD PRESSURE: 138 MMHG | DIASTOLIC BLOOD PRESSURE: 98 MMHG | BODY MASS INDEX: 30.08 KG/M2

## 2017-09-21 DIAGNOSIS — Z79.4 TYPE 2 DIABETES MELLITUS WITHOUT COMPLICATION, WITH LONG-TERM CURRENT USE OF INSULIN (HCC): ICD-10-CM

## 2017-09-21 DIAGNOSIS — H53.9 VISION CHANGES: ICD-10-CM

## 2017-09-21 DIAGNOSIS — E11.9 TYPE 2 DIABETES MELLITUS WITHOUT COMPLICATION, WITH LONG-TERM CURRENT USE OF INSULIN (HCC): ICD-10-CM

## 2017-09-21 DIAGNOSIS — Z89.511 S/P BKA (BELOW KNEE AMPUTATION) UNILATERAL, RIGHT (HCC): Primary | ICD-10-CM

## 2017-09-21 PROCEDURE — 1036F TOBACCO NON-USER: CPT | Performed by: FAMILY MEDICINE

## 2017-09-21 PROCEDURE — 3017F COLORECTAL CA SCREEN DOC REV: CPT | Performed by: FAMILY MEDICINE

## 2017-09-21 PROCEDURE — G8417 CALC BMI ABV UP PARAM F/U: HCPCS | Performed by: FAMILY MEDICINE

## 2017-09-21 PROCEDURE — G8427 DOCREV CUR MEDS BY ELIG CLIN: HCPCS | Performed by: FAMILY MEDICINE

## 2017-09-21 PROCEDURE — 3046F HEMOGLOBIN A1C LEVEL >9.0%: CPT | Performed by: FAMILY MEDICINE

## 2017-09-21 PROCEDURE — 99214 OFFICE O/P EST MOD 30 MIN: CPT | Performed by: FAMILY MEDICINE

## 2017-09-21 PROCEDURE — G8598 ASA/ANTIPLAT THER USED: HCPCS | Performed by: FAMILY MEDICINE

## 2017-09-21 RX ORDER — PANTOPRAZOLE SODIUM 40 MG/1
40 TABLET, DELAYED RELEASE ORAL DAILY
COMMUNITY

## 2017-09-21 RX ORDER — OXYCODONE HYDROCHLORIDE AND ACETAMINOPHEN 5; 325 MG/1; MG/1
1 TABLET ORAL EVERY 4 HOURS PRN
COMMUNITY
End: 2017-09-21

## 2017-09-21 ASSESSMENT — ENCOUNTER SYMPTOMS
FACIAL SWELLING: 0
EYE DISCHARGE: 0
VOMITING: 0
NAUSEA: 0
EYE REDNESS: 0
CONSTIPATION: 0
DIARRHEA: 0
COUGH: 0

## 2017-10-04 RX ORDER — INSULIN GLARGINE 100 [IU]/ML
10 INJECTION, SOLUTION SUBCUTANEOUS NIGHTLY
Qty: 3 VIAL | Refills: 2 | Status: SHIPPED | OUTPATIENT
Start: 2017-10-04 | End: 2018-06-22 | Stop reason: SDUPTHER

## 2017-10-04 RX ORDER — GLUCOSAMINE HCL/CHONDROITIN SU 500-400 MG
CAPSULE ORAL
Qty: 200 STRIP | Refills: 5 | Status: SHIPPED | OUTPATIENT
Start: 2017-10-04

## 2017-10-04 RX ORDER — BLOOD SUGAR DIAGNOSTIC
STRIP MISCELLANEOUS
Qty: 200 EACH | Refills: 3 | Status: SHIPPED | OUTPATIENT
Start: 2017-10-04

## 2017-10-04 NOTE — TELEPHONE ENCOUNTER
lantus  Syringes  Test strips    [facility]   Topic Date Due    Diabetic retinal exam  09/24/1965    HIV screen  09/24/1970    DTaP/Tdap/Td vaccine (1 - Tdap) 09/24/1974    Pneumococcal highest risk (1 of 3 - PCV13) 09/24/1974    Colon Cancer Screen FIT/FOBT  05/12/2015    Zostavax vaccine  09/24/2015    Diabetic hemoglobin A1C test  07/23/2016    Lipid screen  10/22/2016    Diabetic foot exam  04/26/2017    Flu vaccine (1) 09/01/2017    Hepatitis C screen  Completed             (applicable per patient's age: Cancer Screenings, Depression Screening, Fall Risk Screening, Immunizations)    Hemoglobin A1C (%)   Date Value   07/23/2015 9.1   01/20/2015 8.9 (H)   10/01/2014 10.3 (H)     Microalb/Crt.  Ratio (mcg/mg creat)   Date Value   01/20/2015 6348     LDL Cholesterol (mg/dL)   Date Value   10/22/2015 105     AST (U/L)   Date Value   08/02/2017 31     ALT (U/L)   Date Value   08/02/2017 16     BUN (mg/dL)   Date Value   08/06/2017 50 (H)      (goal A1C is < 7)   (goal LDL is <100) need 30-50% reduction from baseline     BP Readings from Last 3 Encounters:   09/21/17 (!) 138/98   08/06/17 (!) 122/59   08/02/17 (!) 149/67    (goal /80)      All Future Testing planned in [facility]:  Lab Frequency Next Occurrence   Hemoglobin A1C Once 12/31/2017   Lipid Panel Once 12/31/2017   Microalbumin, Ur Once 12/31/2017   FULL PFT STUDY WITH PRE AND POST Once 6/10/2017       Next Visit Date:  Future Appointments  Date Time Provider Juan J Mccain   12/19/2017 1:30 PM Rangel Maddox MD [facility]            Patient Active Problem List:     Type 2 diabetes mellitus without complication ([address]     Degeneration of lumbar or lumbosacral intervertebral disc     Impotence of organic origin     Chronic pain in right foot     Hypercholesterolemia     Lung nodule     Pneumonia     Post PTCA     CAD (coronary artery disease)     Essential hypertension     Chronic systolic congestive heart

## 2017-10-31 ENCOUNTER — OFFICE VISIT (OUTPATIENT)
Dept: FAMILY MEDICINE CLINIC | Age: 62
End: 2017-10-31
Payer: MEDICARE

## 2017-10-31 VITALS
TEMPERATURE: 98.5 F | DIASTOLIC BLOOD PRESSURE: 70 MMHG | SYSTOLIC BLOOD PRESSURE: 130 MMHG | WEIGHT: 228 LBS | BODY MASS INDEX: 30.08 KG/M2

## 2017-10-31 DIAGNOSIS — J01.90 ACUTE BACTERIAL SINUSITIS: Primary | ICD-10-CM

## 2017-10-31 DIAGNOSIS — B96.89 ACUTE BACTERIAL SINUSITIS: Primary | ICD-10-CM

## 2017-10-31 PROCEDURE — 99213 OFFICE O/P EST LOW 20 MIN: CPT | Performed by: FAMILY MEDICINE

## 2017-10-31 PROCEDURE — 1036F TOBACCO NON-USER: CPT | Performed by: FAMILY MEDICINE

## 2017-10-31 PROCEDURE — G8484 FLU IMMUNIZE NO ADMIN: HCPCS | Performed by: FAMILY MEDICINE

## 2017-10-31 PROCEDURE — G8427 DOCREV CUR MEDS BY ELIG CLIN: HCPCS | Performed by: FAMILY MEDICINE

## 2017-10-31 PROCEDURE — G8417 CALC BMI ABV UP PARAM F/U: HCPCS | Performed by: FAMILY MEDICINE

## 2017-10-31 PROCEDURE — 3017F COLORECTAL CA SCREEN DOC REV: CPT | Performed by: FAMILY MEDICINE

## 2017-10-31 PROCEDURE — G8598 ASA/ANTIPLAT THER USED: HCPCS | Performed by: FAMILY MEDICINE

## 2017-10-31 RX ORDER — DOXYCYCLINE HYCLATE 100 MG
100 TABLET ORAL 2 TIMES DAILY
Qty: 20 TABLET | Refills: 0 | Status: SHIPPED | OUTPATIENT
Start: 2017-10-31 | End: 2017-11-10

## 2017-10-31 RX ORDER — AMOXICILLIN 250 MG/1
250 CAPSULE ORAL 3 TIMES DAILY
Qty: 30 CAPSULE | Refills: 0 | Status: CANCELLED | OUTPATIENT
Start: 2017-10-31 | End: 2017-11-10

## 2017-10-31 ASSESSMENT — ENCOUNTER SYMPTOMS
SORE THROAT: 0
TROUBLE SWALLOWING: 0
EYE REDNESS: 0
RHINORRHEA: 1
COUGH: 0
EYE DISCHARGE: 0
SHORTNESS OF BREATH: 0
FACIAL SWELLING: 0
SINUS PRESSURE: 1

## 2017-10-31 NOTE — PROGRESS NOTES
indeterminant lesion in the left anterior descending coronary artery with an FFR of 0.79. Mild left ventricular dysfunction, ejection fraction of 40-45%    COLONOSCOPY      DIALYSIS FISTULA CREATION      3-4 years ago     KNEE ARTHROSCOPY      LEG AMPUTATION BELOW KNEE      right lower leg    LEG AMPUTATION BELOW KNEE Right 08/08/2017    ROTATOR CUFF REPAIR          Medications:       Prior to Admission medications    Medication Sig Start Date End Date Taking? Authorizing Provider   insulin glargine (LANTUS) 100 UNIT/ML injection vial Inject 10 Units into the skin nightly He only takes if glucose over 200 10/4/17  Yes Elissa Horta MD   pantoprazole (PROTONIX) 40 MG tablet Take 40 mg by mouth daily   Yes Historical Provider, MD   Aspirin-Acetaminophen-Caffeine (EXCEDRIN EXTRA STRENGTH PO) Take by mouth daily as needed   Yes Historical Provider, MD   isosorbide mononitrate (IMDUR) 60 MG extended release tablet Take 1 tablet by mouth daily 5/9/17  Yes Elissa Horta MD   insulin lispro (HUMALOG) 100 UNIT/ML injection vial Inject 10  U daily   Yes Historical Provider, MD   lidocaine-prilocaine (EMLA) 2.5-2.5 % cream apply topically if needed 3/14/17  Yes Elissa Horta MD   calcitRIOL (ROCALTROL) 0.5 MCG capsule Take 1 capsule by mouth daily 2/7/17  Yes Elissa Horta MD   hydrALAZINE (APRESOLINE) 25 MG tablet Take 25 mg by mouth daily    Yes Historical Provider, MD   atorvastatin (LIPITOR) 10 MG tablet Take 1 tablet by mouth daily 8/4/16  Yes Elissa Horta MD   amLODIPine (NORVASC) 10 MG tablet Take 1 tablet by mouth daily 8/4/16  Yes Elissa Horta MD   prasugrel (EFFIENT) 10 MG TABS Take 1 tablet by mouth daily 8/4/16  Yes Elissa Horta MD   lisinopril (PRINIVIL;ZESTRIL) 20 MG tablet Take 1 tablet by mouth daily 8/4/16  Yes Elissa Horta MD   carvedilol (COREG) 25 MG tablet Take 1 tablet by mouth daily 8/4/16  Yes Elissa Horta MD   aspirin 81 MG tablet Take 81 mg by mouth daily.    Yes headaches. Physical Exam:     Physical Exam   Constitutional: He appears well-developed and well-nourished. HENT:   Head: Normocephalic and atraumatic. Right Ear: Tympanic membrane normal.   Left Ear: Tympanic membrane normal.   Nose: Mucosal edema and rhinorrhea present. Right sinus exhibits maxillary sinus tenderness. Left sinus exhibits maxillary sinus tenderness. Eyes: Conjunctivae are normal. Right eye exhibits no discharge. Left eye exhibits no discharge. Neck: Neck supple. Pulmonary/Chest: Effort normal and breath sounds normal. No respiratory distress. Lymphadenopathy:     He has no cervical adenopathy. Skin: Rash noted. Vitals reviewed. Vitals:  /70   Temp 98.5 °F (36.9 °C) (Oral)   Wt 228 lb (103.4 kg)   BMI 30.08 kg/m²       Data:     Lab Results   Component Value Date     08/06/2017    K 4.2 08/06/2017    CL 79 08/06/2017    CO2 24 08/06/2017    BUN 50 08/06/2017    CREATININE 8.07 08/06/2017    GLUCOSE 695 08/06/2017    PROT 8.7 08/02/2017    LABALBU 2.9 08/02/2017    BILITOT 0.74 08/02/2017    ALKPHOS 75 08/02/2017    AST 31 08/02/2017    ALT 16 08/02/2017     Lab Results   Component Value Date    WBC 16.0 08/06/2017    RBC 3.60 08/06/2017    HGB 9.2 08/06/2017    HCT 28.8 08/06/2017    MCV 80.1 08/06/2017    MCH 25.6 08/06/2017    MCHC 31.9 08/06/2017    RDW 18.8 08/06/2017     08/06/2017    MPV NOT REPORTED 08/06/2017     Lab Results   Component Value Date    TSH 1.28 10/22/2015     Lab Results   Component Value Date    CHOL 177 10/22/2015    HDL 39 10/22/2015    LABA1C 9.1 07/23/2015          Assessment/Plan:       1. Acute bacterial sinusitis  Take all antibiotics, increase rest and fluids. F/U 4-5d if not better or sooner if worse. All questions answered.     Pt told to just take one pill on dialysis days after dialysis        Electronically signed by Freddie Carey MD on 10/31/2017 at 4:44 PM

## 2017-11-06 ENCOUNTER — TELEPHONE (OUTPATIENT)
Dept: FAMILY MEDICINE CLINIC | Age: 62
End: 2017-11-06

## 2017-11-07 RX ORDER — LEVOFLOXACIN 250 MG/1
250 TABLET ORAL DAILY
Qty: 7 TABLET | Refills: 0 | Status: SHIPPED | OUTPATIENT
Start: 2017-11-07 | End: 2017-11-14

## 2017-11-07 NOTE — TELEPHONE ENCOUNTER
We could call in one more antibx ---- he has had before Levaquin 250mg one po daily for 7d. IF not better after that medication then needs appt or if symptoms get worse go to walk in or ER.

## 2017-11-07 NOTE — TELEPHONE ENCOUNTER
Patient angry with advise he was given and he is demanding a different medication be called in for him

## 2017-11-07 NOTE — TELEPHONE ENCOUNTER
Please tell pt to finish the antibxs --- should have 3 more days but if not better or worse in few days needs appt or walk in --- whatever works best for him and his dialysis schedule.

## 2018-01-01 ENCOUNTER — HOSPITAL ENCOUNTER (EMERGENCY)
Age: 63
Discharge: HOME | End: 2018-12-28
Attending: EMERGENCY MEDICINE
Payer: MEDICARE

## 2018-01-01 ENCOUNTER — OFFICE VISIT (OUTPATIENT)
Dept: FAMILY MEDICINE CLINIC | Age: 63
End: 2018-01-01
Payer: MEDICARE

## 2018-01-01 VITALS
OXYGEN SATURATION: 100 % | TEMPERATURE: 98.5 F | RESPIRATION RATE: 20 BRPM | DIASTOLIC BLOOD PRESSURE: 83 MMHG | HEART RATE: 92 BPM | WEIGHT: 235 LBS | BODY MASS INDEX: 31 KG/M2 | SYSTOLIC BLOOD PRESSURE: 148 MMHG

## 2018-01-01 VITALS
HEART RATE: 86 BPM | BODY MASS INDEX: 30.74 KG/M2 | WEIGHT: 233 LBS | DIASTOLIC BLOOD PRESSURE: 86 MMHG | SYSTOLIC BLOOD PRESSURE: 138 MMHG | OXYGEN SATURATION: 98 %

## 2018-01-01 DIAGNOSIS — H61.22 IMPACTED CERUMEN OF LEFT EAR: Primary | ICD-10-CM

## 2018-01-01 DIAGNOSIS — J20.9 ACUTE BRONCHITIS, UNSPECIFIED ORGANISM: Primary | ICD-10-CM

## 2018-01-01 PROCEDURE — 99284 EMERGENCY DEPT VISIT MOD MDM: CPT

## 2018-01-01 PROCEDURE — 69209 REMOVE IMPACTED EAR WAX UNI: CPT | Performed by: FAMILY MEDICINE

## 2018-01-01 RX ORDER — DOXYCYCLINE HYCLATE 100 MG
100 TABLET ORAL 2 TIMES DAILY
Qty: 14 TABLET | Refills: 0 | Status: SHIPPED | OUTPATIENT
Start: 2018-01-01 | End: 2019-01-01

## 2018-01-01 RX ORDER — LIDOCAINE AND PRILOCAINE 25; 25 MG/G; MG/G
CREAM TOPICAL
Qty: 30 G | Refills: 5 | Status: SHIPPED | OUTPATIENT
Start: 2018-01-01

## 2018-01-01 RX ORDER — INSULIN GLARGINE 100 [IU]/ML
30 INJECTION, SOLUTION SUBCUTANEOUS NIGHTLY
Qty: 1 VIAL | Refills: 5 | Status: SHIPPED | OUTPATIENT
Start: 2018-01-01

## 2018-01-01 ASSESSMENT — PATIENT HEALTH QUESTIONNAIRE - PHQ9
SUM OF ALL RESPONSES TO PHQ QUESTIONS 1-9: 0
1. LITTLE INTEREST OR PLEASURE IN DOING THINGS: 0
SUM OF ALL RESPONSES TO PHQ QUESTIONS 1-9: 0
SUM OF ALL RESPONSES TO PHQ9 QUESTIONS 1 & 2: 0
2. FEELING DOWN, DEPRESSED OR HOPELESS: 0

## 2018-01-01 ASSESSMENT — ENCOUNTER SYMPTOMS
EYE REDNESS: 0
EYE DISCHARGE: 0
FACIAL SWELLING: 0
DIARRHEA: 0
COUGH: 0
SHORTNESS OF BREATH: 0

## 2018-01-23 ENCOUNTER — OFFICE VISIT (OUTPATIENT)
Dept: FAMILY MEDICINE CLINIC | Age: 63
End: 2018-01-23
Payer: MEDICARE

## 2018-01-23 VITALS — SYSTOLIC BLOOD PRESSURE: 130 MMHG | DIASTOLIC BLOOD PRESSURE: 80 MMHG

## 2018-01-23 DIAGNOSIS — B02.29 HZV (HERPES ZOSTER VIRUS) POST HERPETIC NEURALGIA: ICD-10-CM

## 2018-01-23 DIAGNOSIS — B02.9 HERPES ZOSTER WITHOUT COMPLICATION: Primary | ICD-10-CM

## 2018-01-23 PROCEDURE — 1036F TOBACCO NON-USER: CPT | Performed by: FAMILY MEDICINE

## 2018-01-23 PROCEDURE — G8598 ASA/ANTIPLAT THER USED: HCPCS | Performed by: FAMILY MEDICINE

## 2018-01-23 PROCEDURE — 99213 OFFICE O/P EST LOW 20 MIN: CPT | Performed by: FAMILY MEDICINE

## 2018-01-23 PROCEDURE — G8417 CALC BMI ABV UP PARAM F/U: HCPCS | Performed by: FAMILY MEDICINE

## 2018-01-23 PROCEDURE — G8484 FLU IMMUNIZE NO ADMIN: HCPCS | Performed by: FAMILY MEDICINE

## 2018-01-23 PROCEDURE — 3017F COLORECTAL CA SCREEN DOC REV: CPT | Performed by: FAMILY MEDICINE

## 2018-01-23 PROCEDURE — G8427 DOCREV CUR MEDS BY ELIG CLIN: HCPCS | Performed by: FAMILY MEDICINE

## 2018-01-23 RX ORDER — LIDOCAINE 50 MG/G
1 PATCH TOPICAL DAILY
Qty: 30 PATCH | Refills: 0 | Status: SHIPPED | OUTPATIENT
Start: 2018-01-23 | End: 2018-01-01

## 2018-01-23 RX ORDER — OXYCODONE HYDROCHLORIDE AND ACETAMINOPHEN 5; 325 MG/1; MG/1
1 TABLET ORAL EVERY 8 HOURS PRN
Qty: 21 TABLET | Refills: 0 | Status: SHIPPED | OUTPATIENT
Start: 2018-01-23 | End: 2018-01-30

## 2018-01-23 ASSESSMENT — ENCOUNTER SYMPTOMS
DIARRHEA: 0
VOMITING: 0
COUGH: 0

## 2018-01-23 NOTE — PROGRESS NOTES
HPI Notes    Name: Deedee Goodwin  : 1955         Chief Complaint:     Chief Complaint   Patient presents with    Rash     Pt referred to PCP, per Dr Caitlyn Frazier for dx of shingles       History of Present Illness:      Deedee Goodwin is a 58 y.o.  male who presents with Rash (Pt referred to PCP, per Dr Caitlyn Frazier for dx of shingles)      Rash   Chronicity: Pt states a few weeks ago he started to have back itching but no rash. Then about a week later pt pt developed a rash seen in Dialysis. The problem has been gradually improving since onset. Location: Lt flank around to Lt lower abdomen. The rash is characterized by blistering and burning. Pertinent negatives include no cough, diarrhea, fever or vomiting. (Pain and burning) Treatments tried: pt given neurontin and hasn't helped the pain and gives him the shakes.        Past Medical History:     Past Medical History:   Diagnosis Date    Blood type B+     CAD (coronary artery disease)     CHF (congestive heart failure) (HCC) 2013    Chronic kidney disease     Degeneration of lumbar or lumbosacral intervertebral disc     Dialysis patient ([facility] [address]     Hx of BKA ([facility] [address]     Rt BKA    Hyperlipidemia     Hypertension     Impotence     Kidney disease     on dialysis    Type II or unspecified type diabetes mellitus without mention of complication, not stated as uncontrolled       Reviewed all health maintenance requirements and ordered appropriate tests  Health Maintenance Due   Topic Date Due    HIV screen  1970    DTaP/Tdap/Td vaccine (1 - Tdap) 1974    Pneumococcal highest risk (1 of 3 - PCV13) 1974    Colon Cancer Screen FIT/FOBT  2015    Zostavax vaccine  2015    A1C test (Diabetic or Prediabetic)  2016    Lipid screen  10/22/2016    Diabetic foot exam  2017    Flu vaccine (1) 2017       Past Surgical History:     Past Surgical History:   Procedure Laterality Date    AMPUTATION      right vomiting. Skin: Positive for rash. Lt side rash   Neurological: Positive for numbness. Rash area feels like a knife jabbing him on Lt back and around. Physical Exam:     Physical Exam   Constitutional: He appears well-developed and well-nourished. HENT:   Head: Normocephalic and atraumatic. Eyes: Conjunctivae are normal. Right eye exhibits no discharge. Left eye exhibits no discharge. Skin: Rash noted. No erythema. A - Lt flank around the dermatome to Lt lower abdomen, lesions are a patch of dried circular papules. No pustules and no vesicles. Vitals reviewed. Vitals:  /80       Data:     Lab Results   Component Value Date     08/06/2017    K 4.2 08/06/2017    CL 79 08/06/2017    CO2 24 08/06/2017    BUN 50 08/06/2017    CREATININE 8.07 08/06/2017    GLUCOSE 695 08/06/2017    PROT 8.7 08/02/2017    LABALBU 2.9 08/02/2017    BILITOT 0.74 08/02/2017    ALKPHOS 75 08/02/2017    AST 31 08/02/2017    ALT 16 08/02/2017     Lab Results   Component Value Date    WBC 16.0 08/06/2017    RBC 3.60 08/06/2017    HGB 9.2 08/06/2017    HCT 28.8 08/06/2017    MCV 80.1 08/06/2017    MCH 25.6 08/06/2017    MCHC 31.9 08/06/2017    RDW 18.8 08/06/2017     08/06/2017    MPV NOT REPORTED 08/06/2017     Lab Results   Component Value Date    TSH 1.28 10/22/2015     Lab Results   Component Value Date    CHOL 177 10/22/2015    HDL 39 10/22/2015    LABA1C 9.1 07/23/2015          Assessment/Plan:       1. Herpes zoster without complication  Lesions resolving    2. HZV (herpes zoster virus) post herpetic neuralgia  SInce pt feels neurontin didn't help pain and made him shake then throw away in the hospital medicine container now. Pt will be given ONLY 1 wk percocet to take every 8hrs as needed. In meantime, will try to get lidocaine patch approved for pain control  - lidocaine (LIDODERM) 5 %; Place 1 patch onto the skin daily 12 hours on, 12 hours off. Dispense: 30 patch;  Refill:

## 2018-02-11 ENCOUNTER — HOSPITAL ENCOUNTER (EMERGENCY)
Age: 63
Discharge: HOME | End: 2018-02-11
Attending: EMERGENCY MEDICINE
Payer: MEDICARE

## 2018-02-11 VITALS
WEIGHT: 250 LBS | DIASTOLIC BLOOD PRESSURE: 116 MMHG | HEART RATE: 88 BPM | TEMPERATURE: 98.6 F | RESPIRATION RATE: 20 BRPM | OXYGEN SATURATION: 99 % | BODY MASS INDEX: 32.98 KG/M2 | SYSTOLIC BLOOD PRESSURE: 171 MMHG

## 2018-02-11 DIAGNOSIS — R10.32 LEFT LOWER QUADRANT PAIN: Primary | ICD-10-CM

## 2018-02-11 DIAGNOSIS — B02.9 HERPES ZOSTER WITHOUT COMPLICATION: ICD-10-CM

## 2018-02-11 LAB
ABSOLUTE EOS #: 0.6 K/UL (ref 0–0.4)
ABSOLUTE IMMATURE GRANULOCYTE: ABNORMAL K/UL (ref 0–0.3)
ABSOLUTE LYMPH #: 1.2 K/UL (ref 1–4.8)
ABSOLUTE MONO #: 0.4 K/UL (ref 0–1)
ALBUMIN SERPL-MCNC: 4 G/DL (ref 3.5–5.2)
ALBUMIN/GLOBULIN RATIO: ABNORMAL (ref 1–2.5)
ALP BLD-CCNC: 90 U/L (ref 40–129)
ALT SERPL-CCNC: 10 U/L (ref 5–41)
ANION GAP SERPL CALCULATED.3IONS-SCNC: 18 MMOL/L (ref 9–17)
AST SERPL-CCNC: 14 U/L
BASOPHILS # BLD: 1 % (ref 0–2)
BASOPHILS ABSOLUTE: 0 K/UL (ref 0–0.2)
BILIRUB SERPL-MCNC: 0.27 MG/DL (ref 0.3–1.2)
BUN BLDV-MCNC: 45 MG/DL (ref 8–23)
BUN/CREAT BLD: 5 (ref 9–20)
CALCIUM SERPL-MCNC: 9.6 MG/DL (ref 8.6–10.4)
CHLORIDE BLD-SCNC: 92 MMOL/L (ref 98–107)
CO2: 29 MMOL/L (ref 20–31)
CREAT SERPL-MCNC: 8.64 MG/DL (ref 0.7–1.2)
DIFFERENTIAL TYPE: YES
EKG ATRIAL RATE: 92 BPM
EKG P AXIS: 75 DEGREES
EKG P-R INTERVAL: 176 MS
EKG Q-T INTERVAL: 398 MS
EKG QRS DURATION: 110 MS
EKG QTC CALCULATION (BAZETT): 492 MS
EKG R AXIS: -34 DEGREES
EKG T AXIS: 108 DEGREES
EKG VENTRICULAR RATE: 92 BPM
EOSINOPHILS RELATIVE PERCENT: 10 % (ref 0–5)
GFR AFRICAN AMERICAN: 8 ML/MIN
GFR NON-AFRICAN AMERICAN: 6 ML/MIN
GFR SERPL CREATININE-BSD FRML MDRD: ABNORMAL ML/MIN/{1.73_M2}
GFR SERPL CREATININE-BSD FRML MDRD: ABNORMAL ML/MIN/{1.73_M2}
GLUCOSE BLD-MCNC: 364 MG/DL (ref 70–99)
HCT VFR BLD CALC: 36.3 % (ref 41–53)
HEMOGLOBIN: 11.7 G/DL (ref 13.5–17.5)
IMMATURE GRANULOCYTES: ABNORMAL %
LYMPHOCYTES # BLD: 18 % (ref 13–44)
MCH RBC QN AUTO: 27.3 PG (ref 26–34)
MCHC RBC AUTO-ENTMCNC: 32.1 G/DL (ref 31–37)
MCV RBC AUTO: 85 FL (ref 80–100)
MONOCYTES # BLD: 6 % (ref 5–9)
NRBC AUTOMATED: ABNORMAL PER 100 WBC
PDW BLD-RTO: 17.1 % (ref 12.1–15.2)
PLATELET # BLD: 340 K/UL (ref 140–450)
PLATELET ESTIMATE: ABNORMAL
PMV BLD AUTO: ABNORMAL FL (ref 6–12)
POTASSIUM SERPL-SCNC: 4.9 MMOL/L (ref 3.7–5.3)
RBC # BLD: 4.27 M/UL (ref 4.5–5.9)
RBC # BLD: ABNORMAL 10*6/UL
SEG NEUTROPHILS: 65 % (ref 39–75)
SEGMENTED NEUTROPHILS ABSOLUTE COUNT: 4.1 K/UL (ref 2.1–6.5)
SODIUM BLD-SCNC: 139 MMOL/L (ref 135–144)
TOTAL PROTEIN: 8.6 G/DL (ref 6.4–8.3)
WBC # BLD: 6.4 K/UL (ref 3.5–11)
WBC # BLD: ABNORMAL 10*3/UL

## 2018-02-11 PROCEDURE — 80053 COMPREHEN METABOLIC PANEL: CPT

## 2018-02-11 PROCEDURE — 96372 THER/PROPH/DIAG INJ SC/IM: CPT

## 2018-02-11 PROCEDURE — 6360000002 HC RX W HCPCS: Performed by: EMERGENCY MEDICINE

## 2018-02-11 PROCEDURE — 93005 ELECTROCARDIOGRAM TRACING: CPT

## 2018-02-11 PROCEDURE — 85025 COMPLETE CBC W/AUTO DIFF WBC: CPT

## 2018-02-11 PROCEDURE — 36415 COLL VENOUS BLD VENIPUNCTURE: CPT

## 2018-02-11 PROCEDURE — 99283 EMERGENCY DEPT VISIT LOW MDM: CPT

## 2018-02-11 RX ORDER — KETOROLAC TROMETHAMINE 30 MG/ML
30 INJECTION, SOLUTION INTRAMUSCULAR; INTRAVENOUS ONCE
Status: COMPLETED | OUTPATIENT
Start: 2018-02-11 | End: 2018-02-11

## 2018-02-11 RX ORDER — OXYCODONE HYDROCHLORIDE AND ACETAMINOPHEN 5; 325 MG/1; MG/1
2 TABLET ORAL EVERY 4 HOURS PRN
COMMUNITY
End: 2018-01-01

## 2018-02-11 RX ADMIN — KETOROLAC TROMETHAMINE 30 MG: 30 INJECTION, SOLUTION INTRAMUSCULAR at 10:54

## 2018-02-11 ASSESSMENT — PAIN SCALES - GENERAL
PAINLEVEL_OUTOF10: 10
PAINLEVEL_OUTOF10: 10

## 2018-02-11 ASSESSMENT — PAIN DESCRIPTION - ORIENTATION: ORIENTATION: LEFT

## 2018-02-11 ASSESSMENT — PAIN DESCRIPTION - LOCATION: LOCATION: ABDOMEN

## 2018-02-11 ASSESSMENT — PAIN DESCRIPTION - ONSET: ONSET: ON-GOING

## 2018-02-11 ASSESSMENT — PAIN DESCRIPTION - DESCRIPTORS: DESCRIPTORS: CONSTANT

## 2018-02-11 ASSESSMENT — PAIN DESCRIPTION - PAIN TYPE: TYPE: ACUTE PAIN

## 2018-02-11 ASSESSMENT — PAIN DESCRIPTION - PROGRESSION: CLINICAL_PROGRESSION: GRADUALLY WORSENING

## 2018-02-11 ASSESSMENT — PAIN DESCRIPTION - FREQUENCY: FREQUENCY: CONTINUOUS

## 2018-02-11 NOTE — ED PROVIDER NOTES
eMERGENCY dEPARTMENT eNCOUnter      [address] [facility]    Chief Complaint   Patient presents with    Abdominal Pain     naeseated       HPI    Victor Manuel Bethea is a 58 y.o. male who presents to ED from home. By car. With complaint of LLQ abdominal pain. Onset 2 weeks ago. Intensity of symptoms moderate. Location of symptoms LLQ. Patient reports normal bowel movements. Patient denies at the this patient. Patient reports some nausea no vomiting. Patient was diagnosed with shingles and treated for shingles on the left lower abdomen and flank. REVIEW OF SYSTEMS    All systems reviewed and positives are in the HPI    PAST MEDICAL HISTORY    Past Medical History:   Diagnosis Date    Blood type B+     CAD (coronary artery disease)     CHF [facility]congestive heart failure) ([facility] 1/2013    Chronic kidney disease     Degeneration of lumbar or lumbosacral intervertebral disc     Dialysis patient ([facility] [address]     Hx of BKA ([facility] [address]     Rt BKA    Hyperlipidemia     Hypertension     Impotence     Kidney disease     on dialysis    Type II or unspecified type diabetes mellitus without mention of complication, not stated as uncontrolled        SURGICAL HISTORY    Past Surgical History:   Procedure Laterality Date    AMPUTATION      right 5th toe    ANGIOPLASTY  02/26/14    Successful complex angioplasty of proximal & mid left anterior descending coronary artery.  CARDIAC CATHETERIZATION Left 01/29/14    Severe CAD, Occluded very large right acoronary artery. Occluded AV branch of the circumflex. 70 -75 % indeterminant lesion in the left anterior descending coronary artery with an FFR of 0.79.   Mild left ventricular dysfunction, ejection fraction of 40-45%    COLONOSCOPY      DIALYSIS FISTULA CREATION      3-4 years ago     KNEE ARTHROSCOPY      LEG AMPUTATION BELOW KNEE      right lower leg    LEG AMPUTATION BELOW KNEE Right 08/08/2017    ROTATOR CUFF REPAIR         CURRENT MEDICATIONS    Current Outpatient Rx

## 2018-02-13 ENCOUNTER — CARE COORDINATION (OUTPATIENT)
Dept: CARE COORDINATION | Age: 63
End: 2018-02-13

## 2018-02-13 NOTE — CARE COORDINATION
Patient was called to follow up with most recent ER visit. There was no answer. A message was left on voicemail to have patient call back regarding ER visit. Office number given [phone].

## 2018-02-21 RX ORDER — PRASUGREL 10 MG/1
10 TABLET, FILM COATED ORAL DAILY
Qty: 30 TABLET | Refills: 5 | Status: SHIPPED | OUTPATIENT
Start: 2018-02-21 | End: 2018-08-26 | Stop reason: SDUPTHER

## 2018-02-21 NOTE — TELEPHONE ENCOUNTER
Patient needs script for Effient - patient uses [facility] - due to be seen tomorrow    Health Maintenance   Topic Date Due    HIV screen  09/24/1970    DTaP/Tdap/Td vaccine (1 - Tdap) 09/24/1974    Pneumococcal highest risk (1 of 3 - PCV13) 09/24/1974    Shingles Vaccine (1 of 2 - 2 Dose Series) 09/24/2005    Colon Cancer Screen FIT/FOBT  05/12/2015    A1C test (Diabetic or Prediabetic)  07/23/2016    Lipid screen  10/22/2016    Diabetic foot exam  04/26/2017    Flu vaccine (1) 09/01/2017    Diabetic retinal exam  10/17/2018    Potassium monitoring  02/11/2019    Creatinine monitoring  02/11/2019    Hepatitis C screen  Completed             (applicable per patient's age: Cancer Screenings, Depression Screening, Fall Risk Screening, Immunizations)    Hemoglobin A1C (%)   Date Value   07/23/2015 9.1   01/20/2015 8.9 (H)   10/01/2014 10.3 (H)     Microalb/Crt.  Ratio (mcg/mg creat)   Date Value   01/20/2015 6,348     LDL Cholesterol (mg/dL)   Date Value   10/22/2015 105     AST (U/L)   Date Value   02/11/2018 14     ALT (U/L)   Date Value   02/11/2018 10     BUN (mg/dL)   Date Value   02/11/2018 45 (H)      (goal A1C is < 7)   (goal LDL is <100) need 30-50% reduction from baseline     BP Readings from Last 3 Encounters:   02/11/18 (!) 171/116   01/23/18 130/80   10/31/17 130/70    (goal /80)      All Future Testing planned in [facility]:  Lab Frequency Next Occurrence   Hemoglobin A1C Once 12/31/2017   Lipid Panel Once 12/31/2017   Microalbumin, Ur Once 12/31/2017   FULL PFT STUDY WITH PRE AND POST Once 06/10/2017       Next Visit Date:  Future Appointments  Date Time Provider Juan J Mccain   2/22/2018 1:30 PM Verenice Villagomez MD [facility]            Patient Active Problem List:     Type 2 diabetes mellitus without complication [facility])     Degeneration of lumbar or lumbosacral intervertebral disc     Impotence of organic origin     Chronic pain in right foot     Hypercholesterolemia     Lung

## 2018-02-22 ENCOUNTER — OFFICE VISIT (OUTPATIENT)
Dept: FAMILY MEDICINE CLINIC | Age: 63
End: 2018-02-22
Payer: MEDICARE

## 2018-02-22 VITALS
BODY MASS INDEX: 32.59 KG/M2 | DIASTOLIC BLOOD PRESSURE: 90 MMHG | OXYGEN SATURATION: 98 % | WEIGHT: 247 LBS | HEART RATE: 86 BPM | SYSTOLIC BLOOD PRESSURE: 130 MMHG

## 2018-02-22 DIAGNOSIS — I50.22 CHRONIC SYSTOLIC CONGESTIVE HEART FAILURE (HCC): ICD-10-CM

## 2018-02-22 DIAGNOSIS — E11.22 TYPE 2 DIABETES MELLITUS WITH CHRONIC KIDNEY DISEASE ON CHRONIC DIALYSIS, WITH LONG-TERM CURRENT USE OF INSULIN (HCC): Primary | ICD-10-CM

## 2018-02-22 DIAGNOSIS — Z89.511 S/P BKA (BELOW KNEE AMPUTATION) UNILATERAL, RIGHT (HCC): ICD-10-CM

## 2018-02-22 DIAGNOSIS — Z12.11 SCREENING FOR COLON CANCER: ICD-10-CM

## 2018-02-22 DIAGNOSIS — N18.5 CHRONIC KIDNEY DISEASE, STAGE V (VERY SEVERE) (HCC): ICD-10-CM

## 2018-02-22 DIAGNOSIS — N18.6 TYPE 2 DIABETES MELLITUS WITH CHRONIC KIDNEY DISEASE ON CHRONIC DIALYSIS, WITH LONG-TERM CURRENT USE OF INSULIN (HCC): Primary | ICD-10-CM

## 2018-02-22 DIAGNOSIS — Z79.4 TYPE 2 DIABETES MELLITUS WITH CHRONIC KIDNEY DISEASE ON CHRONIC DIALYSIS, WITH LONG-TERM CURRENT USE OF INSULIN (HCC): Primary | ICD-10-CM

## 2018-02-22 DIAGNOSIS — Z99.2 TYPE 2 DIABETES MELLITUS WITH CHRONIC KIDNEY DISEASE ON CHRONIC DIALYSIS, WITH LONG-TERM CURRENT USE OF INSULIN (HCC): Primary | ICD-10-CM

## 2018-02-22 LAB — HBA1C MFR BLD: 10.7 %

## 2018-02-22 PROCEDURE — 1036F TOBACCO NON-USER: CPT | Performed by: FAMILY MEDICINE

## 2018-02-22 PROCEDURE — 99214 OFFICE O/P EST MOD 30 MIN: CPT | Performed by: FAMILY MEDICINE

## 2018-02-22 PROCEDURE — G8484 FLU IMMUNIZE NO ADMIN: HCPCS | Performed by: FAMILY MEDICINE

## 2018-02-22 PROCEDURE — G8417 CALC BMI ABV UP PARAM F/U: HCPCS | Performed by: FAMILY MEDICINE

## 2018-02-22 PROCEDURE — 3017F COLORECTAL CA SCREEN DOC REV: CPT | Performed by: FAMILY MEDICINE

## 2018-02-22 PROCEDURE — 83036 HEMOGLOBIN GLYCOSYLATED A1C: CPT | Performed by: FAMILY MEDICINE

## 2018-02-22 PROCEDURE — G8427 DOCREV CUR MEDS BY ELIG CLIN: HCPCS | Performed by: FAMILY MEDICINE

## 2018-02-22 PROCEDURE — 3046F HEMOGLOBIN A1C LEVEL >9.0%: CPT | Performed by: FAMILY MEDICINE

## 2018-02-22 PROCEDURE — G8598 ASA/ANTIPLAT THER USED: HCPCS | Performed by: FAMILY MEDICINE

## 2018-02-22 RX ORDER — ATORVASTATIN CALCIUM 10 MG/1
10 TABLET, FILM COATED ORAL DAILY
Qty: 30 TABLET | Refills: 5 | Status: SHIPPED | OUTPATIENT
Start: 2018-02-22 | End: 2018-10-27 | Stop reason: SDUPTHER

## 2018-02-22 RX ORDER — AMLODIPINE BESYLATE 10 MG/1
10 TABLET ORAL DAILY
Qty: 30 TABLET | Refills: 5 | Status: SHIPPED | OUTPATIENT
Start: 2018-02-22 | End: 2019-01-01 | Stop reason: SDUPTHER

## 2018-02-22 RX ORDER — LISINOPRIL 20 MG/1
20 TABLET ORAL DAILY
Qty: 30 TABLET | Refills: 5 | Status: SHIPPED | OUTPATIENT
Start: 2018-02-22 | End: 2018-10-27 | Stop reason: SDUPTHER

## 2018-02-22 RX ORDER — CARVEDILOL 25 MG/1
25 TABLET ORAL DAILY
Qty: 30 TABLET | Refills: 5 | Status: SHIPPED | OUTPATIENT
Start: 2018-02-22 | End: 2018-10-27 | Stop reason: SDUPTHER

## 2018-02-22 ASSESSMENT — ENCOUNTER SYMPTOMS
FACIAL SWELLING: 0
EYE PAIN: 0
EYE REDNESS: 0
DIARRHEA: 0
CONSTIPATION: 0
TROUBLE SWALLOWING: 0
ABDOMINAL PAIN: 0
BLOOD IN STOOL: 0
EYE DISCHARGE: 0
SHORTNESS OF BREATH: 0
COUGH: 0
VOMITING: 0
NAUSEA: 0

## 2018-02-22 NOTE — PROGRESS NOTES
HPI Notes    Name: Chante Aguilera  : 1955         Chief Complaint:     Chief Complaint   Patient presents with    Diabetes       History of Present Illness:      Chante Aguilera is a 58 y.o.  male who presents with Diabetes      Diabetes   He presents for his follow-up diabetic visit. He has type 2 diabetes mellitus. His disease course has been stable. Pertinent negatives for hypoglycemia include no confusion, dizziness, headaches or pallor. Pertinent negatives for diabetes include no chest pain and no foot ulcerations. S/p Rt BKA - chronic but stable for pt. Pt is not having any pain. Pt uses crutches but walks well otherwise with his crutches. End stage kidneys on dialysis - chronic and stable for pt. Pt goes to dialysis [facility], [facility], F and pt is followed by nephrology. No recent changes. CHF - chronic but stable on current meds. No SOB. No chest pain and no LE swelling. Colon cancer screening - pt denies blood in the stool and no bowel changes.  Will take home a FIT test.  Past Medical History:     Past Medical History:   Diagnosis Date    Blood type B+     CAD (coronary artery disease)     CHF (congestive heart failure) ([facility] [address] 2013    Chronic kidney disease     Degeneration of lumbar or lumbosacral intervertebral disc     Dialysis patient ([facility] [address]     Hx of BKA ([facility] [address]     Rt BKA    Hyperlipidemia     Hypertension     Impotence     Kidney disease     on dialysis    Type II or unspecified type diabetes mellitus without mention of complication, not stated as uncontrolled       Reviewed all health maintenance requirements and ordered appropriate tests  Health Maintenance Due   Topic Date Due    HIV screen  1970    DTaP/Tdap/Td vaccine (1 - Tdap) 1974    Pneumococcal highest risk (1 of 3 - PCV13) 1974    Shingles Vaccine (1 of 2 - 2 Dose Series) 2005    Colon Cancer Screen FIT/FOBT  2015    A1C test (Diabetic or Prediabetic)  10/23/2015    Lipid

## 2018-04-28 RX ORDER — OMEPRAZOLE 20 MG/1
CAPSULE, DELAYED RELEASE ORAL
Qty: 30 CAPSULE | Refills: 5 | Status: SHIPPED | OUTPATIENT
Start: 2018-04-28 | End: 2018-10-27 | Stop reason: SDUPTHER

## 2018-06-22 ENCOUNTER — TELEPHONE (OUTPATIENT)
Dept: FAMILY MEDICINE CLINIC | Age: 63
End: 2018-06-22

## 2018-06-22 RX ORDER — INSULIN GLARGINE 100 [IU]/ML
10 INJECTION, SOLUTION SUBCUTANEOUS NIGHTLY
Qty: 3 VIAL | Refills: 2 | Status: SHIPPED | OUTPATIENT
Start: 2018-06-22 | End: 2018-01-01 | Stop reason: SDUPTHER

## 2018-08-27 RX ORDER — PRASUGREL 10 MG/1
10 TABLET, FILM COATED ORAL DAILY
Qty: 30 TABLET | Refills: 5 | Status: SHIPPED | OUTPATIENT
Start: 2018-08-27 | End: 2019-01-01 | Stop reason: SDUPTHER

## 2018-10-03 ENCOUNTER — OFFICE VISIT (OUTPATIENT)
Dept: FAMILY MEDICINE CLINIC | Age: 63
End: 2018-10-03
Payer: MEDICARE

## 2018-10-03 VITALS
BODY MASS INDEX: 32.34 KG/M2 | OXYGEN SATURATION: 93 % | WEIGHT: 244 LBS | HEIGHT: 73 IN | HEART RATE: 86 BPM | DIASTOLIC BLOOD PRESSURE: 80 MMHG | SYSTOLIC BLOOD PRESSURE: 140 MMHG

## 2018-10-03 DIAGNOSIS — H60.392 OTHER INFECTIVE ACUTE OTITIS EXTERNA OF LEFT EAR: ICD-10-CM

## 2018-10-03 DIAGNOSIS — L29.9 ITCHING: Primary | ICD-10-CM

## 2018-10-03 PROCEDURE — 1036F TOBACCO NON-USER: CPT | Performed by: FAMILY MEDICINE

## 2018-10-03 PROCEDURE — G8598 ASA/ANTIPLAT THER USED: HCPCS | Performed by: FAMILY MEDICINE

## 2018-10-03 PROCEDURE — 4130F TOPICAL PREP RX AOE: CPT | Performed by: FAMILY MEDICINE

## 2018-10-03 PROCEDURE — G8417 CALC BMI ABV UP PARAM F/U: HCPCS | Performed by: FAMILY MEDICINE

## 2018-10-03 PROCEDURE — 3017F COLORECTAL CA SCREEN DOC REV: CPT | Performed by: FAMILY MEDICINE

## 2018-10-03 PROCEDURE — 99213 OFFICE O/P EST LOW 20 MIN: CPT | Performed by: FAMILY MEDICINE

## 2018-10-03 PROCEDURE — G8427 DOCREV CUR MEDS BY ELIG CLIN: HCPCS | Performed by: FAMILY MEDICINE

## 2018-10-03 PROCEDURE — G8484 FLU IMMUNIZE NO ADMIN: HCPCS | Performed by: FAMILY MEDICINE

## 2018-10-03 RX ORDER — SEVELAMER CARBONATE 800 MG/1
1 TABLET, FILM COATED ORAL
COMMUNITY

## 2018-10-03 RX ORDER — HYDROXYZINE HYDROCHLORIDE 25 MG/1
25 TABLET, FILM COATED ORAL 3 TIMES DAILY PRN
Qty: 30 TABLET | Refills: 5 | Status: SHIPPED | OUTPATIENT
Start: 2018-10-03

## 2018-10-03 ASSESSMENT — ENCOUNTER SYMPTOMS
FACIAL SWELLING: 0
EYE REDNESS: 0
RHINORRHEA: 0
COLOR CHANGE: 0
EYE DISCHARGE: 0
VOMITING: 0
SHORTNESS OF BREATH: 0
DIARRHEA: 0
SORE THROAT: 0
COUGH: 0

## 2018-10-03 NOTE — PROGRESS NOTES
HPI Notes    Name: Mallika Villatoro  : 1955         Chief Complaint:     Chief Complaint   Patient presents with   Alli Bull     states they are throbbing       History of Present Illness:      Mallika Villatoro is a 61 y.o.  male who presents with Otalgia (states they are throbbing)      HPI  Lt ear pain - Pt states he has had Lt ear pain for about one month. Pt states he has been taking Excedrin for pain which helps. No pain in RT ear. No ear drainage and no bleeding. Pt has tried putting some peroxide in it. Pt has decreased hearing. Itching - Pt states he has itching ONLY after dialysis. Pt takes dialysis M, W,F and is itching so bad after dialysis. Pt states never happened in [place] and [place]. Pt states only in the [facility] dialysis. Pt goes home and takes a shower and itching is gone. Pt wants something for the itching. Pt states anything he touches there itches.      Past Medical History:     Past Medical History:   Diagnosis Date    Blood type B+     CAD (coronary artery disease)     CH[facility]congest[facility] failure) ([facility]) 2013    Chronic kidney disease     Degeneration of lumbar or lumbosacral intervertebral disc     Dialysis [facility] ([facility] [address]     Hx of BKA ([facility] [address]     Rt BKA    Hyperlipidemia     Hypertension     Impotence     Kidney disease     on dialysis    Type II or unspecified type diabetes mellitus without mention of complication, not stated as uncontrolled       Reviewed all health maintenance requirements and ordered appropriate tests  Health Maintenance Due   Topic Date Due    HIV screen  1970    DTaP/Tdap/Td vaccine (1 - Tdap) 1974    Pneumococcal highest risk (1 of 3 - PCV13) 1974    Shingles Vaccine (1 of 2 - 2 Dose Series) 2005    Colon Cancer Screen FIT/FOBT  2015    Lipid screen  10/22/2016    A1C test (Diabetic or Prediabetic)  2018    Flu vaccine (1) 2018       Past Surgical History:     Past Surgical History: 25 MG tablet Take 1 tablet by mouth daily 2/22/18  Yes Andrae Rodas MD   oxyCODONE-acetaminophen (PERCOCET) 5-325 MG per tablet Take 2 tablets by mouth every 4 hours as needed for Pain. Yes Historical Provider, MD   lidocaine (LIDODERM) 5 % Place 1 patch onto the skin daily 12 hours on, 12 hours off. 1/23/18  Yes Andrae Rodas MD   Glucose Blood (BLOOD GLUCOSE TEST STRIPS) STRP TEST 2 TIMES DAILY 10/4/17  Yes Andrae Rodas MD   Insulin Syringe-Needle U-100 (BD INSULIN SYRINGE ULTRAFINE) 31G X 5/16\" 1 ML MISC Inject 2 times daily 10/4/17  Yes Andrae Rdoas MD   glucose blood VI test strips (TRUETEST TEST) strip Please dispense True result  Use daily as needed 10/4/17  Yes Andrae Rodas MD   pantoprazole (PROTONIX) 40 MG tablet Take 40 mg by mouth daily   Yes Historical Provider, MD   Aspirin-Acetaminophen-Caffeine (EXCEDRIN EXTRA STRENGTH PO) Take by mouth daily as needed   Yes Historical Provider, MD   fluticasone Agatha Kawasaki) 50 MCG/ACT nasal spray 1 spray by Nasal route daily 5/25/17  Yes Andrae Rodas MD   isosorbide mononitrate (IMDUR) 60 MG extended release tablet Take 1 tablet by mouth daily 5/9/17  Yes Andrae Rodas MD   lidocaine-prilocaine (EMLA) 2.5-2.5 % cream apply topically if needed 3/14/17  Yes Andrae Rodas MD   hydrALAZINE (APRESOLINE) 25 MG tablet Take 25 mg by mouth daily    Yes Historical Provider, MD   sildenafil (VIAGRA) 50 MG tablet Take 1 tablet by mouth as needed for Erectile Dysfunction 4/26/16  Yes Andrae Rodas MD   B Complex-C-Folic Acid (RENAL) 1 MG CAPS Take 1 capsule by mouth daily as needed  7/15/15  Yes Historical Provider, MD   nitroGLYCERIN (NITROSTAT) 0.4 MG SL tablet Place 1 tablet under the tongue every 5 minutes as needed for Chest pain 6/17/15  Yes Cruz Peterson,    aspirin 81 MG tablet Take 81 mg by mouth daily.    Yes Historical Provider, MD   Lancets MISC Check blood glucose 2 times daily-TRUE PLUS LANCETS- 1/9/14  Yes Sam Majano, DO Wt 244 lb (110.7 kg)   SpO2 93%   BMI 32.19 kg/m²       Data:     Lab Results   Component Value Date     02/11/2018    K 4.9 02/11/2018    CL 92 02/11/2018    CO2 29 02/11/2018    BUN 45 02/11/2018    CREATININE 8.64 02/11/2018    GLUCOSE 364 02/11/2018    PROT 8.6 02/11/2018    LABALBU 4.0 02/11/2018    BILITOT 0.27 02/11/2018    ALKPHOS 90 02/11/2018    AST 14 02/11/2018    ALT 10 02/11/2018     Lab Results   Component Value Date    WBC 6.4 02/11/2018    RBC 4.27 02/11/2018    HGB 11.7 02/11/2018    HCT 36.3 02/11/2018    MCV 85.0 02/11/2018    MCH 27.3 02/11/2018    MCHC 32.1 02/11/2018    RDW 17.1 02/11/2018     02/11/2018    MPV NOT REPORTED 02/11/2018     Lab Results   Component Value Date    TSH 1.28 10/22/2015     Lab Results   Component Value Date    CHOL 177 10/22/2015    HDL 39 10/22/2015    LABA1C 10.7 02/22/2018          Assessment/Plan:       1. Other infective acute otitis externa of left ear  Pt to use ear drops daily for 7-10d -- 3 drops TID    2. Itching  Gave pt Rx for atarax and pt told he could bring to dialysis and see if ok to take the atarax after dialysis              Return if symptoms worsen or fail to improve.       Electronically signed by Mike Mccray MD on 10/3/2018 at 2:22 PM

## 2018-10-27 RX ORDER — LISINOPRIL 20 MG/1
20 TABLET ORAL DAILY
Qty: 30 TABLET | Refills: 5 | Status: SHIPPED | OUTPATIENT
Start: 2018-10-27 | End: 2019-01-01 | Stop reason: SDUPTHER

## 2018-10-27 RX ORDER — OMEPRAZOLE 20 MG/1
CAPSULE, DELAYED RELEASE ORAL
Qty: 30 CAPSULE | Refills: 5 | Status: SHIPPED | OUTPATIENT
Start: 2018-10-27 | End: 2019-01-01 | Stop reason: SDUPTHER

## 2018-10-27 RX ORDER — ATORVASTATIN CALCIUM 10 MG/1
10 TABLET, FILM COATED ORAL DAILY
Qty: 30 TABLET | Refills: 5 | Status: SHIPPED | OUTPATIENT
Start: 2018-10-27 | End: 2019-01-01 | Stop reason: SDUPTHER

## 2018-10-27 RX ORDER — CARVEDILOL 25 MG/1
25 TABLET ORAL DAILY
Qty: 30 TABLET | Refills: 5 | Status: SHIPPED | OUTPATIENT
Start: 2018-10-27 | End: 2019-01-01 | Stop reason: SDUPTHER

## 2018-10-27 NOTE — TELEPHONE ENCOUNTER
Last OV 10/3/18 for ear and itching  Last OV for check up 6/22/18  No upcoming apt scheduled  Requesting refills on omeprazole, lisinopril, carvedilol and atorvastatin thru sure script

## 2018-11-29 ENCOUNTER — OFFICE VISIT (OUTPATIENT)
Dept: FAMILY MEDICINE CLINIC | Age: 63
End: 2018-11-29
Payer: MEDICARE

## 2018-11-29 VITALS
OXYGEN SATURATION: 90 % | BODY MASS INDEX: 31.4 KG/M2 | DIASTOLIC BLOOD PRESSURE: 84 MMHG | TEMPERATURE: 98.3 F | SYSTOLIC BLOOD PRESSURE: 130 MMHG | WEIGHT: 238 LBS

## 2018-11-29 DIAGNOSIS — A08.4 VIRAL GASTROENTERITIS: ICD-10-CM

## 2018-11-29 DIAGNOSIS — R19.7 DIARRHEA, UNSPECIFIED TYPE: Primary | ICD-10-CM

## 2018-11-29 DIAGNOSIS — R10.9 ABDOMINAL CRAMPING: ICD-10-CM

## 2018-11-29 DIAGNOSIS — R10.12 LEFT UPPER QUADRANT PAIN: ICD-10-CM

## 2018-11-29 PROCEDURE — G8598 ASA/ANTIPLAT THER USED: HCPCS | Performed by: NURSE PRACTITIONER

## 2018-11-29 PROCEDURE — G8484 FLU IMMUNIZE NO ADMIN: HCPCS | Performed by: NURSE PRACTITIONER

## 2018-11-29 PROCEDURE — 3017F COLORECTAL CA SCREEN DOC REV: CPT | Performed by: NURSE PRACTITIONER

## 2018-11-29 PROCEDURE — G8417 CALC BMI ABV UP PARAM F/U: HCPCS | Performed by: NURSE PRACTITIONER

## 2018-11-29 PROCEDURE — 1036F TOBACCO NON-USER: CPT | Performed by: NURSE PRACTITIONER

## 2018-11-29 PROCEDURE — G8427 DOCREV CUR MEDS BY ELIG CLIN: HCPCS | Performed by: NURSE PRACTITIONER

## 2018-11-29 PROCEDURE — 99213 OFFICE O/P EST LOW 20 MIN: CPT | Performed by: NURSE PRACTITIONER

## 2018-11-29 RX ORDER — LOPERAMIDE HYDROCHLORIDE 2 MG/1
2 CAPSULE ORAL 4 TIMES DAILY PRN
Qty: 40 CAPSULE | Refills: 0 | Status: SHIPPED | OUTPATIENT
Start: 2018-11-29 | End: 2018-01-01

## 2018-11-29 RX ORDER — DICYCLOMINE HYDROCHLORIDE 10 MG/1
10 CAPSULE ORAL 4 TIMES DAILY
Qty: 120 CAPSULE | Refills: 3 | Status: SHIPPED | OUTPATIENT
Start: 2018-11-29

## 2018-11-29 ASSESSMENT — ENCOUNTER SYMPTOMS
VOMITING: 0
SHORTNESS OF BREATH: 0
ABDOMINAL PAIN: 1
COUGH: 0
DIARRHEA: 1
NAUSEA: 0

## 2018-11-29 NOTE — PROGRESS NOTES
tenderness in the left upper quadrant. There is guarding. There is no rigidity. Skin: Skin is warm, dry and intact. Nursing note and vitals reviewed. Data:     Lab Results   Component Value Date     02/11/2018    K 4.9 02/11/2018    CL 92 02/11/2018    CO2 29 02/11/2018    BUN 45 02/11/2018    CREATININE 8.64 02/11/2018    GLUCOSE 364 02/11/2018    PROT 8.6 02/11/2018    LABALBU 4.0 02/11/2018    BILITOT 0.27 02/11/2018    ALKPHOS 90 02/11/2018    AST 14 02/11/2018    ALT 10 02/11/2018     Lab Results   Component Value Date    WBC 6.4 02/11/2018    RBC 4.27 02/11/2018    HGB 11.7 02/11/2018    HCT 36.3 02/11/2018    MCV 85.0 02/11/2018    MCH 27.3 02/11/2018    MCHC 32.1 02/11/2018    RDW 17.1 02/11/2018     02/11/2018    MPV NOT REPORTED 02/11/2018     Lab Results   Component Value Date    TSH 1.28 10/22/2015     Lab Results   Component Value Date    CHOL 177 10/22/2015    HDL 39 10/22/2015    LABA1C 10.7 02/22/2018          Assessment & Plan        Diagnosis Orders   1. Diarrhea, unspecified type  dicyclomine (BENTYL) 10 MG capsule    loperamide (IMODIUM) 2 MG capsule   2. Left upper quadrant pain     3. Abdominal cramping  dicyclomine (BENTYL) 10 MG capsule   4. Viral gastroenteritis       Symptoms consist with viral gastroenteritis with abdominal cramping. Pt educated to take imodium. Will also give bentyl. Pt educated about both medications. Patient verbalizes understanding and agreement with plan. All questions answered. If symptoms do not resolve or worsen, return to office. Completed Refills   Requested Prescriptions     Signed Prescriptions Disp Refills    dicyclomine (BENTYL) 10 MG capsule 120 capsule 3     Sig: Take 1 capsule by mouth 4 times daily    loperamide (IMODIUM) 2 MG capsule 40 capsule 0     Sig: Take 1 capsule by mouth 4 times daily as needed for Diarrhea     Return if symptoms worsen or fail to improve.   Orders Placed This Encounter   Medications  dicyclomine (BENTYL) 10 MG capsule     Sig: Take 1 capsule by mouth 4 times daily     Dispense:  120 capsule     Refill:  3    loperamide (IMODIUM) 2 MG capsule     Sig: Take 1 capsule by mouth 4 times daily as needed for Diarrhea     Dispense:  40 capsule     Refill:  0     No orders of the defined types were placed in this encounter. Patient Instructions     SURVEY:    You may be receiving a survey from [employer] regarding your visit today. Please complete the survey to enable us to provide the highest quality of care to you and your family. If you cannot score us a very good on any question, please call the office to discuss how we could have made your experience a very good one. Thank you. Electronically signed by JANENE Muniz CNP on 11/29/2018 at 1:43 PM           Completed Refills   Requested Prescriptions     Signed Prescriptions Disp Refills    dicyclomine (BENTYL) 10 MG capsule 120 capsule 3     Sig: Take 1 capsule by mouth 4 times daily    loperamide (IMODIUM) 2 MG capsule 40 capsule 0     Sig: Take 1 capsule by mouth 4 times daily as needed for Diarrhea         Ramírez received counseling on the following healthy behaviors: nutrition and medication adherence  Reviewed prior labs and health maintenance. Continue current medications, diet and exercise. Discussed use, benefit, and side effects of prescribed medications. Barriers to medication compliance addressed. Patient given educational materials - see patient instructions. All patient questions answered. Patient voiced understanding.

## 2018-12-11 NOTE — PROGRESS NOTES
HPI Notes    Name: Mallika Villatoro  : 1955        Chief Complaint:     Chief Complaint   Patient presents with    Cerumen Impaction       History of Present Illness:     Mallika Villatoro is a 61 y.o.  male who presents with Cerumen Impaction      HPI  Cerumen impaction - Lt ear is plugged up and pt states can't hear out of the ear. Rt ear is ok but states it may need to be cleaned out too. Past Medical History:     Past Medical History:   Diagnosis Date    Blood type B+     CAD (coronary artery disease)     CHF (congestive heart failure) ([facility]) 2013    Chronic kidney disease     Degeneration of lumbar or lumbosacral intervertebral disc     Dialysis patient ([facility] [address]     Hx of BKA ([facility] [address]     Rt BKA    Hyperlipidemia     Hypertension     Impotence     Kidney disease     on dialysis    Type II or unspecified type diabetes mellitus without mention of complication, not stated as uncontrolled       Reviewed all health maintenance requirements and ordered appropriate tests  Health Maintenance Due   Topic Date Due    HIV screen  1970    DTaP/Tdap/Td vaccine (1 - Tdap) 1974    Pneumococcal highest risk (1 of 3 - PCV13) 1974    Shingles Vaccine (1 of 2 - 2 Dose Series) 2005    Colon Cancer Screen FIT/FOBT  2015    Lipid screen  10/22/2016    A1C test (Diabetic or Prediabetic)  2018    Flu vaccine (1) 2018       Past Surgical History:     Past Surgical History:   Procedure Laterality Date    AMPUTATION      right 5th toe    ANGIOPLASTY  14    Successful complex angioplasty of proximal & mid left anterior descending coronary artery.  CARDIAC CATHETERIZATION Left 14    Severe CAD, Occluded very large right acoronary artery. Occluded AV branch of the circumflex. 70 -75 % indeterminant lesion in the left anterior descending coronary artery with an FFR of 0.79.   Mild left ventricular dysfunction, ejection fraction of 40-45%    COLONOSCOPY % cream apply topically if needed 3/14/17  Yes Josephine Real MD   metoclopramide (REGLAN) 5 MG tablet Take 5 mg by mouth daily    Yes Historical Provider, MD   calcitRIOL (ROCALTROL) 0.5 MCG capsule Take 1 capsule by mouth daily 2/7/17  Yes Josephine Real MD   hydrALAZINE (APRESOLINE) 25 MG tablet Take 25 mg by mouth daily    Yes Historical Provider, MD   B Complex-C-Folic Acid (RENAL) 1 MG CAPS Take 1 capsule by mouth daily as needed  7/15/15  Yes Historical Provider, MD   aspirin 81 MG tablet Take 81 mg by mouth daily. Yes Historical Provider, MD   Glucose Blood (BLOOD GLUCOSE TEST STRIPS) STRP TEST 2 TIMES DAILY 10/4/17   Josephine Real MD   Insulin Syringe-Needle U-100 (BD INSULIN SYRINGE ULTRAFINE) 31G X 5/16\" 1 ML MISC Inject 2 times daily 10/4/17   Josephine Real MD   glucose blood VI test strips (TRUETEST TEST) strip Please dispense True result  Use daily as needed 10/4/17   Josephine Real MD   fluticasone [facility]) 50 MCG/ACT nasal spray 1 spray by Nasal route daily 5/25/17   Josephine Real MD   insulin lispro (HUMALOG) 100 UNIT/ML injection vial Inject 10  U daily    Historical Provider, MD   sildenafil (VIAGRA) 50 MG tablet Take 1 tablet by mouth as needed for Erectile Dysfunction 4/26/16   Josephine Real MD   nitroGLYCERIN (NITROSTAT) 0.4 MG SL tablet Place 1 tablet under the tongue every 5 minutes as needed for Chest pain 6/17/15   Emily Terry DO   Lancets MISC Check blood glucose 2 times daily-TRUE PLUS LANCETS- 1/9/14   Prakash Majano DO        Allergies:       Food    Social History:     Tobacco:    reports that he quit smoking about 6 years ago. He has a 2.00 pack-year smoking history. He quit smokeless tobacco use about 6 years ago. Alcohol:      reports that he does not drink alcohol. Drug Use:  reports that he does not use drugs.     Family History:     Family History   Problem Relation Age of Onset    Cancer Father         esophagus    Cancer Sister     Diabetes Other

## 2019-01-01 ENCOUNTER — HOSPITAL ENCOUNTER (EMERGENCY)
Age: 64
Discharge: ANOTHER HOSPITAL | End: 2019-07-19
Attending: INTERNAL MEDICINE
Payer: MEDICARE

## 2019-01-01 ENCOUNTER — HOSPITAL ENCOUNTER (OUTPATIENT)
Dept: INTERVENTIONAL RADIOLOGY/VASCULAR | Age: 64
Discharge: HOME | End: 2019-06-11
Attending: INTERNAL MEDICINE | Admitting: INTERNAL MEDICINE
Payer: MEDICARE

## 2019-01-01 ENCOUNTER — TELEPHONE (OUTPATIENT)
Dept: FAMILY MEDICINE CLINIC | Age: 64
End: 2019-01-01

## 2019-01-01 ENCOUNTER — OFFICE VISIT (OUTPATIENT)
Dept: VASCULAR SURGERY | Age: 64
End: 2019-01-01
Payer: MEDICARE

## 2019-01-01 ENCOUNTER — HOSPITAL ENCOUNTER (OUTPATIENT)
Age: 64
Discharge: HOME | End: 2019-05-23
Payer: MEDICARE

## 2019-01-01 ENCOUNTER — HOSPITAL ENCOUNTER (OUTPATIENT)
Dept: MRI IMAGING | Age: 64
Discharge: HOME | End: 2019-06-06
Payer: MEDICARE

## 2019-01-01 ENCOUNTER — OFFICE VISIT (OUTPATIENT)
Dept: FAMILY MEDICINE CLINIC | Age: 64
End: 2019-01-01
Payer: MEDICARE

## 2019-01-01 ENCOUNTER — HOSPITAL ENCOUNTER (OUTPATIENT)
Age: 64
Setting detail: SPECIMEN
Discharge: HOME | End: 2019-05-30
Payer: MEDICARE

## 2019-01-01 ENCOUNTER — HOSPITAL ENCOUNTER (EMERGENCY)
Age: 64
Discharge: HOME | End: 2019-07-01
Attending: EMERGENCY MEDICINE
Payer: MEDICARE

## 2019-01-01 ENCOUNTER — HOSPITAL ENCOUNTER (OUTPATIENT)
Age: 64
Setting detail: SPECIMEN
Discharge: HOME | End: 2019-01-03
Payer: MEDICARE

## 2019-01-01 VITALS
DIASTOLIC BLOOD PRESSURE: 62 MMHG | TEMPERATURE: 97.5 F | WEIGHT: 214 LBS | HEART RATE: 75 BPM | SYSTOLIC BLOOD PRESSURE: 114 MMHG | HEIGHT: 73 IN | BODY MASS INDEX: 28.36 KG/M2

## 2019-01-01 VITALS
SYSTOLIC BLOOD PRESSURE: 148 MMHG | BODY MASS INDEX: 28.36 KG/M2 | HEIGHT: 73 IN | TEMPERATURE: 98.8 F | HEART RATE: 77 BPM | WEIGHT: 214 LBS | DIASTOLIC BLOOD PRESSURE: 78 MMHG

## 2019-01-01 VITALS
DIASTOLIC BLOOD PRESSURE: 80 MMHG | WEIGHT: 237 LBS | BODY MASS INDEX: 31.27 KG/M2 | SYSTOLIC BLOOD PRESSURE: 138 MMHG | HEART RATE: 96 BPM | TEMPERATURE: 98.3 F

## 2019-01-01 VITALS
HEIGHT: 73 IN | SYSTOLIC BLOOD PRESSURE: 163 MMHG | WEIGHT: 214 LBS | DIASTOLIC BLOOD PRESSURE: 85 MMHG | TEMPERATURE: 98.9 F | BODY MASS INDEX: 28.36 KG/M2 | OXYGEN SATURATION: 99 % | HEART RATE: 80 BPM | RESPIRATION RATE: 16 BRPM

## 2019-01-01 VITALS
OXYGEN SATURATION: 96 % | HEIGHT: 73 IN | TEMPERATURE: 97.6 F | RESPIRATION RATE: 18 BRPM | HEART RATE: 79 BPM | DIASTOLIC BLOOD PRESSURE: 80 MMHG | BODY MASS INDEX: 28.36 KG/M2 | WEIGHT: 214 LBS | SYSTOLIC BLOOD PRESSURE: 142 MMHG

## 2019-01-01 VITALS
TEMPERATURE: 98.5 F | DIASTOLIC BLOOD PRESSURE: 81 MMHG | HEART RATE: 88 BPM | RESPIRATION RATE: 20 BRPM | HEIGHT: 73 IN | BODY MASS INDEX: 28.36 KG/M2 | SYSTOLIC BLOOD PRESSURE: 152 MMHG | OXYGEN SATURATION: 98 % | WEIGHT: 214 LBS

## 2019-01-01 VITALS
HEART RATE: 78 BPM | BODY MASS INDEX: 28.76 KG/M2 | OXYGEN SATURATION: 96 % | SYSTOLIC BLOOD PRESSURE: 166 MMHG | WEIGHT: 218 LBS | DIASTOLIC BLOOD PRESSURE: 78 MMHG

## 2019-01-01 DIAGNOSIS — R06.02 SOB (SHORTNESS OF BREATH): ICD-10-CM

## 2019-01-01 DIAGNOSIS — R09.82 PND (POST-NASAL DRIP): ICD-10-CM

## 2019-01-01 DIAGNOSIS — R19.7 DIARRHEA OF PRESUMED INFECTIOUS ORIGIN: ICD-10-CM

## 2019-01-01 DIAGNOSIS — T82.9XXA COMPLICATION OF ARTERIOVENOUS DIALYSIS FISTULA, INITIAL ENCOUNTER: Primary | ICD-10-CM

## 2019-01-01 DIAGNOSIS — E11.621 DIABETIC ULCER OF TOE OF LEFT FOOT ASSOCIATED WITH TYPE 2 DIABETES MELLITUS, LIMITED TO BREAKDOWN OF SKIN (HCC): ICD-10-CM

## 2019-01-01 DIAGNOSIS — I10 ESSENTIAL HYPERTENSION: ICD-10-CM

## 2019-01-01 DIAGNOSIS — L97.521 DIABETIC ULCER OF TOE OF LEFT FOOT ASSOCIATED WITH TYPE 2 DIABETES MELLITUS, LIMITED TO BREAKDOWN OF SKIN (HCC): ICD-10-CM

## 2019-01-01 DIAGNOSIS — T82.838A SURGICAL ARTERIOVENOUS FISTULA HEMORRHAGE, INITIAL ENCOUNTER (HCC): Primary | ICD-10-CM

## 2019-01-01 DIAGNOSIS — I70.229 CRITICAL ISCHEMIA OF LOWER EXTREMITY (HCC): ICD-10-CM

## 2019-01-01 DIAGNOSIS — G93.31 POST VIRAL SYNDROME: ICD-10-CM

## 2019-01-01 DIAGNOSIS — H60.502 ACUTE NONINFECTIVE OTITIS EXTERNA OF LEFT EAR, UNSPECIFIED TYPE: ICD-10-CM

## 2019-01-01 DIAGNOSIS — I73.9 PAD (PERIPHERAL ARTERY DISEASE) (HCC): Primary | ICD-10-CM

## 2019-01-01 DIAGNOSIS — Z99.2 HEMODIALYSIS PATIENT (HCC): ICD-10-CM

## 2019-01-01 DIAGNOSIS — H60.502 ACUTE NONINFECTIVE OTITIS EXTERNA OF LEFT EAR, UNSPECIFIED TYPE: Primary | ICD-10-CM

## 2019-01-01 DIAGNOSIS — L97.521 CHRONIC ULCER OF GREAT TOE OF LEFT FOOT, LIMITED TO BREAKDOWN OF SKIN (HCC): ICD-10-CM

## 2019-01-01 DIAGNOSIS — Z01.812 BLOOD TESTS PRIOR TO TREATMENT OR PROCEDURE: ICD-10-CM

## 2019-01-01 DIAGNOSIS — R53.83 OTHER FATIGUE: Primary | ICD-10-CM

## 2019-01-01 DIAGNOSIS — I70.229 CRITICAL ISCHEMIA OF LOWER EXTREMITY (HCC): Primary | ICD-10-CM

## 2019-01-01 LAB
ABSOLUTE EOS #: 0.2 K/UL (ref 0–0.44)
ABSOLUTE IMMATURE GRANULOCYTE: <0.03 K/UL (ref 0–0.3)
ABSOLUTE LYMPH #: 1.18 K/UL (ref 1.1–3.7)
ABSOLUTE MONO #: 0.46 K/UL (ref 0.1–1.2)
ACTIVATED CLOTTING TIME: 211 SEC (ref 79–149)
ACTIVATED CLOTTING TIME: 215 SEC (ref 79–149)
ACTIVATED CLOTTING TIME: 227 SEC (ref 79–149)
ACTIVATED CLOTTING TIME: 235 SEC (ref 79–149)
ACTIVATED CLOTTING TIME: 247 SEC (ref 79–149)
ACTIVATED CLOTTING TIME: 259 SEC (ref 79–149)
ANION GAP SERPL CALCULATED.3IONS-SCNC: 15 MMOL/L (ref 9–17)
BASOPHILS # BLD: 1 % (ref 0–2)
BASOPHILS ABSOLUTE: 0.07 K/UL (ref 0–0.2)
BUN BLDV-MCNC: 26 MG/DL (ref 8–23)
BUN BLDV-MCNC: 51 MG/DL (ref 8–23)
BUN/CREAT BLD: 5 (ref 9–20)
CALCIUM SERPL-MCNC: 8.8 MG/DL (ref 8.6–10.4)
CHLORIDE BLD-SCNC: 89 MMOL/L (ref 98–107)
CO2: 27 MMOL/L (ref 20–31)
CREAT SERPL-MCNC: 5.73 MG/DL (ref 0.7–1.2)
CREAT SERPL-MCNC: 8.12 MG/DL (ref 0.7–1.2)
CULTURE: ABNORMAL
CULTURE: ABNORMAL
DIFFERENTIAL TYPE: ABNORMAL
DIRECT EXAM: ABNORMAL
EOSINOPHILS RELATIVE PERCENT: 4 % (ref 1–4)
GFR AFRICAN AMERICAN: 12 ML/MIN
GFR AFRICAN AMERICAN: 8 ML/MIN
GFR NON-AFRICAN AMERICAN: 10 ML/MIN
GFR NON-AFRICAN AMERICAN: 7 ML/MIN
GFR SERPL CREATININE-BSD FRML MDRD: ABNORMAL ML/MIN/{1.73_M2}
GLUCOSE BLD-MCNC: 205 MG/DL (ref 70–99)
GLUCOSE BLD-MCNC: 210 MG/DL (ref 74–100)
HCT VFR BLD CALC: 30.6 % (ref 40.7–50.3)
HEMOGLOBIN: 9.4 G/DL (ref 13–17)
IMMATURE GRANULOCYTES: 0 %
LYMPHOCYTES # BLD: 22 % (ref 24–43)
Lab: ABNORMAL
Lab: ABNORMAL
MCH RBC QN AUTO: 26.5 PG (ref 25.2–33.5)
MCHC RBC AUTO-ENTMCNC: 30.7 G/DL (ref 28.4–34.8)
MCV RBC AUTO: 86.2 FL (ref 82.6–102.9)
MONOCYTES # BLD: 9 % (ref 3–12)
NRBC AUTOMATED: 0 PER 100 WBC
PDW BLD-RTO: 19.9 % (ref 11.8–14.4)
PLATELET # BLD: 290 K/UL (ref 138–453)
PLATELET ESTIMATE: ABNORMAL
PMV BLD AUTO: 9.6 FL (ref 8.1–13.5)
POTASSIUM SERPL-SCNC: 3.9 MMOL/L (ref 3.7–5.3)
RBC # BLD: 3.55 M/UL (ref 4.21–5.77)
RBC # BLD: ABNORMAL 10*6/UL
SEG NEUTROPHILS: 64 % (ref 36–65)
SEGMENTED NEUTROPHILS ABSOLUTE COUNT: 3.42 K/UL (ref 1.5–8.1)
SODIUM BLD-SCNC: 131 MMOL/L (ref 135–144)
SPECIMEN DESCRIPTION: ABNORMAL
SPECIMEN DESCRIPTION: ABNORMAL
STATUS: ABNORMAL
WBC # BLD: 5.3 K/UL (ref 3.5–11.3)
WBC # BLD: ABNORMAL 10*3/UL

## 2019-01-01 PROCEDURE — G8427 DOCREV CUR MEDS BY ELIG CLIN: HCPCS | Performed by: NURSE PRACTITIONER

## 2019-01-01 PROCEDURE — 99204 OFFICE O/P NEW MOD 45 MIN: CPT | Performed by: INTERNAL MEDICINE

## 2019-01-01 PROCEDURE — G8417 CALC BMI ABV UP PARAM F/U: HCPCS | Performed by: INTERNAL MEDICINE

## 2019-01-01 PROCEDURE — 99214 OFFICE O/P EST MOD 30 MIN: CPT | Performed by: FAMILY MEDICINE

## 2019-01-01 PROCEDURE — G8598 ASA/ANTIPLAT THER USED: HCPCS | Performed by: INTERNAL MEDICINE

## 2019-01-01 PROCEDURE — 4130F TOPICAL PREP RX AOE: CPT | Performed by: NURSE PRACTITIONER

## 2019-01-01 PROCEDURE — 99213 OFFICE O/P EST LOW 20 MIN: CPT | Performed by: INTERNAL MEDICINE

## 2019-01-01 PROCEDURE — 87205 SMEAR GRAM STAIN: CPT

## 2019-01-01 PROCEDURE — 1036F TOBACCO NON-USER: CPT | Performed by: INTERNAL MEDICINE

## 2019-01-01 PROCEDURE — 99213 OFFICE O/P EST LOW 20 MIN: CPT | Performed by: NURSE PRACTITIONER

## 2019-01-01 PROCEDURE — 3046F HEMOGLOBIN A1C LEVEL >9.0%: CPT | Performed by: FAMILY MEDICINE

## 2019-01-01 PROCEDURE — 6360000002 HC RX W HCPCS

## 2019-01-01 PROCEDURE — G8598 ASA/ANTIPLAT THER USED: HCPCS | Performed by: FAMILY MEDICINE

## 2019-01-01 PROCEDURE — G8484 FLU IMMUNIZE NO ADMIN: HCPCS | Performed by: NURSE PRACTITIONER

## 2019-01-01 PROCEDURE — G8417 CALC BMI ABV UP PARAM F/U: HCPCS | Performed by: NURSE PRACTITIONER

## 2019-01-01 PROCEDURE — 36140 INTRO NDL ICATH UPR/LXTR ART: CPT | Performed by: INTERNAL MEDICINE

## 2019-01-01 PROCEDURE — 99152 MOD SED SAME PHYS/QHP 5/>YRS: CPT | Performed by: INTERNAL MEDICINE

## 2019-01-01 PROCEDURE — 85025 COMPLETE CBC W/AUTO DIFF WBC: CPT

## 2019-01-01 PROCEDURE — 3017F COLORECTAL CA SCREEN DOC REV: CPT | Performed by: FAMILY MEDICINE

## 2019-01-01 PROCEDURE — G8427 DOCREV CUR MEDS BY ELIG CLIN: HCPCS | Performed by: INTERNAL MEDICINE

## 2019-01-01 PROCEDURE — 80048 BASIC METABOLIC PNL TOTAL CA: CPT

## 2019-01-01 PROCEDURE — 36415 COLL VENOUS BLD VENIPUNCTURE: CPT

## 2019-01-01 PROCEDURE — 37228 HC TIB PER TERRITORY PLASTY: CPT | Performed by: INTERNAL MEDICINE

## 2019-01-01 PROCEDURE — 2022F DILAT RTA XM EVC RTNOPTHY: CPT | Performed by: FAMILY MEDICINE

## 2019-01-01 PROCEDURE — C1769 GUIDE WIRE: HCPCS

## 2019-01-01 PROCEDURE — 86403 PARTICLE AGGLUT ANTBDY SCRN: CPT

## 2019-01-01 PROCEDURE — 82565 ASSAY OF CREATININE: CPT

## 2019-01-01 PROCEDURE — 87070 CULTURE OTHR SPECIMN AEROBIC: CPT

## 2019-01-01 PROCEDURE — 99283 EMERGENCY DEPT VISIT LOW MDM: CPT

## 2019-01-01 PROCEDURE — 1036F TOBACCO NON-USER: CPT | Performed by: NURSE PRACTITIONER

## 2019-01-01 PROCEDURE — 3017F COLORECTAL CA SCREEN DOC REV: CPT | Performed by: INTERNAL MEDICINE

## 2019-01-01 PROCEDURE — 99153 MOD SED SAME PHYS/QHP EA: CPT | Performed by: INTERNAL MEDICINE

## 2019-01-01 PROCEDURE — G8598 ASA/ANTIPLAT THER USED: HCPCS | Performed by: NURSE PRACTITIONER

## 2019-01-01 PROCEDURE — 1036F TOBACCO NON-USER: CPT | Performed by: FAMILY MEDICINE

## 2019-01-01 PROCEDURE — G8427 DOCREV CUR MEDS BY ELIG CLIN: HCPCS | Performed by: FAMILY MEDICINE

## 2019-01-01 PROCEDURE — 87186 SC STD MICRODIL/AGAR DIL: CPT

## 2019-01-01 PROCEDURE — 73718 MRI LOWER EXTREMITY W/O DYE: CPT

## 2019-01-01 PROCEDURE — 6370000000 HC RX 637 (ALT 250 FOR IP): Performed by: INTERNAL MEDICINE

## 2019-01-01 PROCEDURE — G8417 CALC BMI ABV UP PARAM F/U: HCPCS | Performed by: FAMILY MEDICINE

## 2019-01-01 PROCEDURE — 82947 ASSAY GLUCOSE BLOOD QUANT: CPT

## 2019-01-01 PROCEDURE — 3017F COLORECTAL CA SCREEN DOC REV: CPT | Performed by: NURSE PRACTITIONER

## 2019-01-01 PROCEDURE — 2500000003 HC RX 250 WO HCPCS

## 2019-01-01 PROCEDURE — 84520 ASSAY OF UREA NITROGEN: CPT

## 2019-01-01 PROCEDURE — 6360000004 HC RX CONTRAST MEDICATION: Performed by: INTERNAL MEDICINE

## 2019-01-01 PROCEDURE — 6360000002 HC RX W HCPCS: Performed by: INTERNAL MEDICINE

## 2019-01-01 PROCEDURE — 85347 COAGULATION TIME ACTIVATED: CPT

## 2019-01-01 RX ORDER — OMEPRAZOLE 20 MG/1
CAPSULE, DELAYED RELEASE ORAL
Qty: 30 CAPSULE | Refills: 5 | Status: SHIPPED | OUTPATIENT
Start: 2019-01-01

## 2019-01-01 RX ORDER — FENTANYL CITRATE 50 UG/ML
25 INJECTION, SOLUTION INTRAMUSCULAR; INTRAVENOUS
Status: DISCONTINUED | OUTPATIENT
Start: 2019-01-01 | End: 2019-01-01

## 2019-01-01 RX ORDER — CARVEDILOL 25 MG/1
25 TABLET ORAL DAILY
Qty: 30 TABLET | Refills: 5 | Status: SHIPPED | OUTPATIENT
Start: 2019-01-01

## 2019-01-01 RX ORDER — LISINOPRIL 20 MG/1
TABLET ORAL
Qty: 30 TABLET | Refills: 5 | Status: SHIPPED | OUTPATIENT
Start: 2019-01-01

## 2019-01-01 RX ORDER — PRASUGREL 10 MG/1
10 TABLET, FILM COATED ORAL DAILY
Qty: 30 TABLET | Refills: 5 | Status: SHIPPED | OUTPATIENT
Start: 2019-01-01

## 2019-01-01 RX ORDER — FENTANYL CITRATE 50 UG/ML
25 INJECTION, SOLUTION INTRAMUSCULAR; INTRAVENOUS EVERY 30 MIN PRN
Status: DISCONTINUED | OUTPATIENT
Start: 2019-01-01 | End: 2019-01-01 | Stop reason: HOSPADM

## 2019-01-01 RX ORDER — SODIUM CHLORIDE 0.9 % (FLUSH) 0.9 %
10 SYRINGE (ML) INJECTION EVERY 12 HOURS SCHEDULED
Status: DISCONTINUED | OUTPATIENT
Start: 2019-01-01 | End: 2019-01-01 | Stop reason: HOSPADM

## 2019-01-01 RX ORDER — AMLODIPINE BESYLATE 10 MG/1
10 TABLET ORAL DAILY
Qty: 30 TABLET | Refills: 5 | Status: SHIPPED | OUTPATIENT
Start: 2019-01-01

## 2019-01-01 RX ORDER — PROMETHAZINE HYDROCHLORIDE AND CODEINE PHOSPHATE 6.25; 1 MG/5ML; MG/5ML
5 SYRUP ORAL EVERY 4 HOURS PRN
Qty: 100 ML | Refills: 0 | Status: SHIPPED | OUTPATIENT
Start: 2019-01-01 | End: 2019-01-01

## 2019-01-01 RX ORDER — ACETAMINOPHEN 325 MG/1
650 TABLET ORAL EVERY 4 HOURS PRN
Status: DISCONTINUED | OUTPATIENT
Start: 2019-01-01 | End: 2019-01-01 | Stop reason: HOSPADM

## 2019-01-01 RX ORDER — ATORVASTATIN CALCIUM 10 MG/1
TABLET, FILM COATED ORAL
Qty: 30 TABLET | Refills: 5 | Status: SHIPPED | OUTPATIENT
Start: 2019-01-01

## 2019-01-01 RX ORDER — SODIUM CHLORIDE 0.9 % (FLUSH) 0.9 %
10 SYRINGE (ML) INJECTION PRN
Status: DISCONTINUED | OUTPATIENT
Start: 2019-01-01 | End: 2019-01-01 | Stop reason: HOSPADM

## 2019-01-01 RX ADMIN — IOPAMIDOL 120 ML: 755 INJECTION, SOLUTION INTRAVENOUS at 12:15

## 2019-01-01 RX ADMIN — FENTANYL CITRATE 25 MCG: 50 INJECTION, SOLUTION INTRAMUSCULAR; INTRAVENOUS at 11:52

## 2019-01-01 RX ADMIN — FENTANYL CITRATE 25 MCG: 50 INJECTION, SOLUTION INTRAMUSCULAR; INTRAVENOUS at 15:43

## 2019-01-01 RX ADMIN — FENTANYL CITRATE 25 MCG: 50 INJECTION, SOLUTION INTRAMUSCULAR; INTRAVENOUS at 12:53

## 2019-01-01 RX ADMIN — ACETAMINOPHEN 650 MG: 325 TABLET, FILM COATED ORAL at 17:06

## 2019-01-01 ASSESSMENT — PAIN SCALES - GENERAL
PAINLEVEL_OUTOF10: 8
PAINLEVEL_OUTOF10: 10
PAINLEVEL_OUTOF10: 3
PAINLEVEL_OUTOF10: 10
PAINLEVEL_OUTOF10: 5
PAINLEVEL_OUTOF10: 10
PAINLEVEL_OUTOF10: 5
PAINLEVEL_OUTOF10: 10

## 2019-01-01 ASSESSMENT — ENCOUNTER SYMPTOMS
VISUAL CHANGE: 0
DIARRHEA: 0
VOMITING: 0
EYE REDNESS: 0
COUGH: 1
SHORTNESS OF BREATH: 0
DIARRHEA: 1
SHORTNESS OF BREATH: 1
SORE THROAT: 0
VOMITING: 0
COUGH: 0
NAUSEA: 0
EYE DISCHARGE: 0
NAUSEA: 0
ABDOMINAL PAIN: 0
ROS SKIN COMMENTS: LT TOE

## 2019-01-01 ASSESSMENT — PAIN DESCRIPTION - DESCRIPTORS: DESCRIPTORS: SHARP

## 2019-01-01 ASSESSMENT — PAIN - FUNCTIONAL ASSESSMENT: PAIN_FUNCTIONAL_ASSESSMENT: 0-10

## 2019-05-23 NOTE — PATIENT INSTRUCTIONS
SURVEY:    You may be receiving a survey from [employer] regarding your visit today. Please complete the survey to enable us to provide the highest quality of care to you and your family. If you cannot score us a very good on any question, please call the office to discuss how we could have made your experience a very good one. Thank you.

## 2019-05-23 NOTE — PROGRESS NOTES
HPI Notes    Name: Clementine Land  : 1955        Chief Complaint:     Chief Complaint   Patient presents with    Fatigue     Pt states he has no energy to do anything, he gets out of breath easy.  Wound Check     Pt noted sore on Left great toe over the past several weeks. Pt is using neosporin     Diarrhea    Shortness of Breath       History of Present Illness:     Clementine Land is a 61 y.o.  male who presents with Fatigue (Pt states he has no energy to do anything, he gets out of breath easy. ); Wound Check (Pt noted sore on Left great toe over the past several weeks. Pt is using neosporin ); Diarrhea; and Shortness of Breath      Fatigue   This is a new problem. Episode onset: Pt states past 2-3 mos he has just been more tired --- NO energy. Pt states expects to have low energy with dialysis but just seems worse in past 2-3 mos. The problem occurs every several days. The problem has been gradually worsening. Associated symptoms include fatigue. Pertinent negatives include no abdominal pain, chest pain, chills, congestion, coughing, fever, headaches, joint swelling, nausea, numbness, rash, sore throat, visual change or vomiting. Associated symptoms comments: Pt did have the diarrhea but better past 2d so maybe something he ate. . He has tried nothing for the symptoms. Wound Check   Treated in ED: Lt big toe wound started a few months ago. Prior ED Treatment: pt keeping in clean with peroxide and neosporin. There has been no drainage from the wound. There is no redness present. There is no swelling present. The pain has worsened. Diarrhea    This is a new problem. Episode onset: few days ago pt has some diarrhea and pt thought maybe something her ate. The problem has been gradually improving (Pt has not had any diarrhea for 2d now. ). Associated symptoms include weight loss. Pertinent negatives include no abdominal pain, chills, coughing, fever, headaches, URI or vomiting. Associated symptoms comments: Decreased appetite. . Treatments tried: pepto bismul. The treatment provided moderate relief. Shortness of Breath   This is a new problem. The problem has been unchanged. Pertinent negatives include no abdominal pain, chest pain, ear pain, fever, headaches, leg swelling, rash, sore throat, syncope or vomiting. He has tried nothing for the symptoms. Past Medical History:     Past Medical History:   Diagnosis Date    Blood type B+     CAD (coronary artery disease)     CHF (congestive heart failure) ([facility]) 1/2013    Chronic kidney disease     Degeneration of lumbar or lumbosacral intervertebral disc     Dialysis patient ([facility] [address]     Hx of BKA ([facility] [address]     Rt BKA    Hyperlipidemia     Hypertension     Impotence     Kidney disease     on dialysis    Type II or unspecified type diabetes mellitus without mention of complication, not stated as uncontrolled       Reviewed all health maintenance requirements and ordered appropriate tests  Health Maintenance Due   Topic Date Due    Pneumococcal 0-64 years Vaccine (1 of 3 - PCV13) 09/24/1961    HIV screen  09/24/1970    Hepatitis B Vaccine (1 of 3 - Risk Recombivax 3-dose series) 09/24/1974    DTaP/Tdap/Td vaccine (1 - Tdap) 09/24/1974    Shingles Vaccine (1 of 2) 09/24/2005    Colon Cancer Screen FIT/FOBT  05/12/2015    Diabetic microalbuminuria test  01/20/2016    Lipid screen  10/22/2016    Potassium monitoring  02/11/2019    Creatinine monitoring  02/11/2019    Diabetic foot exam  02/22/2019    A1C test (Diabetic or Prediabetic)  02/22/2019       Past Surgical History:     Past Surgical History:   Procedure Laterality Date    AMPUTATION      right 5th toe    ANGIOPLASTY  02/26/14    Successful complex angioplasty of proximal & mid left anterior descending coronary artery.  CARDIAC CATHETERIZATION Left 01/29/14    Severe CAD, Occluded very large right acoronary artery. Occluded AV branch of the circumflex.   70 -75 % indeterminant lesion in the left anterior descending coronary artery with an FFR of 0.79. Mild left ventricular dysfunction, ejection fraction of 40-45%    COLONOSCOPY      DIALYSIS FISTULA CREATION      3-4 years ago     KNEE ARTHROSCOPY      LEG AMPUTATION BELOW KNEE      right lower leg    LEG AMPUTATION BELOW KNEE Right 08/08/2017    ROTATOR CUFF REPAIR          Medications:       Prior to Admission medications    Medication Sig Start Date End Date Taking?  Authorizing Provider   prasugrel (EFFIENT) 10 MG TABS take 1 tablet by mouth daily 2/27/19  Yes Yamilka Duong MD   amLODIPine (NORVASC) 10 MG tablet take 1 tablet by mouth daily 1/21/19  Yes Yamilka Duong MD   insulin glargine (LANTUS) 100 UNIT/ML injection vial Inject 30 Units into the skin nightly He only takes if glucose over 200 12/11/18  Yes Yamilka Duong MD   dicyclomine (BENTYL) 10 MG capsule Take 1 capsule by mouth 4 times daily 11/29/18  Yes JANENE Wilson - CNP   atorvastatin (LIPITOR) 10 MG tablet take 1 tablet by mouth daily 10/27/18  Yes Yamilka Duong MD   carvedilol (COREG) 25 MG tablet take 1 tablet by mouth daily 10/27/18  Yes Yamilka Duong MD   lisinopril (PRINIVIL;ZESTRIL) 20 MG tablet take 1 tablet by mouth daily 10/27/18  Yes Yamilka Duong MD   omeprazole (PRILOSEC) 20 MG delayed release capsule take 1 capsule by mouth once daily 10/27/18  Yes Yamilka Duong MD   Aspirin-Acetaminophen-Caffeine (EXCEDRIN EXTRA STRENGTH PO) Take by mouth daily as needed   Yes Historical Provider, MD   isosorbide mononitrate (IMDUR) 60 MG extended release tablet Take 1 tablet by mouth daily 5/9/17  Yes Yamilka Duong MD   calcitRIOL (ROCALTROL) 0.5 MCG capsule Take 1 capsule by mouth daily 2/7/17  Yes Yamilka Duong MD   hydrALAZINE (APRESOLINE) 25 MG tablet Take 25 mg by mouth daily    Yes Historical Provider, MD GALAN Complex-C-Folic Acid (RENAL) 1 MG CAPS Take 1 capsule by mouth daily as needed  7/15/15  Yes Historical Provider, MD   aspirin 81 MG tablet Take 81 mg by mouth daily. Yes Historical Provider, MD   lidocaine-prilocaine (EMLA) 2.5-2.5 % cream Apply topically 3 times per week before dialysis 12/11/18   Gurvinder Fletcher MD   sevelamer (RENVELA) 800 MG tablet Take 1 tablet by mouth 3 times daily (with meals)    Historical Provider, MD   hydrOXYzine (ATARAX) 25 MG tablet Take 1 tablet by mouth 3 times daily as needed for Itching 10/3/18   Gurvinder Fletcher MD   Glucose Blood (BLOOD GLUCOSE TEST STRIPS) STRP TEST 2 TIMES DAILY 10/4/17   Gurvinder Fletcher MD   Insulin Syringe-Needle U-100 (BD INSULIN SYRINGE ULTRAFINE) 31G X 5/16\" 1 ML MISC Inject 2 times daily 10/4/17   Gurvinder Fletcher MD   glucose blood VI test strips (TRUETEST TEST) strip Please dispense True result  Use daily as needed 10/4/17   Gurvinder Fletcher MD   pantoprazole (PROTONIX) 40 MG tablet Take 40 mg by mouth daily    Historical Provider, MD   fluticasone (FLONASE) 50 MCG/ACT nasal spray 1 spray by Nasal route daily 5/25/17   Gurvinder Fletcher MD   insulin lispro (HUMALOG) 100 UNIT/ML injection vial Inject 10  U daily    Historical Provider, MD   metoclopramide (REGLAN) 5 MG tablet Take 5 mg by mouth daily     Historical Provider, MD   sildenafil (VIAGRA) 50 MG tablet Take 1 tablet by mouth as needed for Erectile Dysfunction 4/26/16   Gurvinder Fletcher MD   nitroGLYCERIN (NITROSTAT) 0.4 MG SL tablet Place 1 tablet under the tongue every 5 minutes as needed for Chest pain 6/17/15   Andrea Guerrero DO   Lancets MISC Check blood glucose 2 times daily-TRUE PLUS LANCETS- 1/9/14   Kristi Majano DO        Allergies:       Food    Social History:     Tobacco:    reports that he quit smoking about 6 years ago. He has a 2.00 pack-year smoking history. He quit smokeless tobacco use about 7 years ago. Alcohol:      reports that he does not drink alcohol. Drug Use:  reports that he does not use drugs.     Family History:     Family History   Problem Relation Age of Onset    Cancer Father         esophagus    Cancer Sister     Diabetes Other         family history       Review of Systems:       Review of Systems   Constitutional: Positive for fatigue, unexpected weight change and weight loss. Negative for chills and fever. HENT: Negative for congestion, ear pain and sore throat. Eyes: Negative for discharge and redness. Respiratory: Positive for shortness of breath. Negative for cough. Cardiovascular: Negative for chest pain, palpitations, leg swelling and syncope. Gastrointestinal: Positive for diarrhea. Negative for abdominal pain, nausea and vomiting. Genitourinary: Negative for difficulty urinating, dysuria and hematuria. Musculoskeletal: Negative for joint swelling and neck stiffness. Skin: Positive for wound. Negative for pallor and rash. Lt toe   Neurological: Negative for dizziness, facial asymmetry, numbness and headaches. Psychiatric/Behavioral: Negative for confusion and sleep disturbance. Physical Exam:     Physical Exam   Constitutional: He appears well-developed and well-nourished. HENT:   Head: Normocephalic and atraumatic. Eyes: Pupils are equal, round, and reactive to light. Conjunctivae are normal. Right eye exhibits no discharge. Left eye exhibits no discharge. Neck: Neck supple. No thyromegaly present. Cardiovascular: Normal rate, regular rhythm and normal heart sounds. No murmur heard. Pulmonary/Chest: Effort normal and breath sounds normal. No stridor. He has no wheezes. Abdominal: Soft. Bowel sounds are normal. He exhibits no distension. There is no tenderness. Musculoskeletal: He exhibits no edema. Rt leg BKA   Lymphadenopathy:     He has no cervical adenopathy. Neurological: He is alert. Skin: No rash noted. No erythema. A - Lt tip of 1st (big) to <0.5cm open wound dry with no erythema and on 1st skin layer disrupted. Psychiatric: He has a normal mood and affect.  His behavior is

## 2019-05-31 NOTE — TELEPHONE ENCOUNTER
Oxygen was 96 at last appointment on 5/23/19. Would you like to order testing to see if he qualifies for oxygen based on your last note?

## 2019-05-31 NOTE — TELEPHONE ENCOUNTER
[facility] called wanting to know if Inge Aguirre would qualify for home oxygen. [facility] states Inge Aguirre wears it when he is there in their office. Patient last seen 5/23/19. Health Maintenance   Topic Date Due    Pneumococcal 0-64 years Vaccine (1 of 3 - PCV13) 09/24/1961    HIV screen  09/24/1970    Hepatitis B Vaccine (1 of 3 - Risk Recombivax 3-dose series) 09/24/1974    DTaP/Tdap/Td vaccine (1 - Tdap) 09/24/1974    Shingles Vaccine (1 of 2) 09/24/2005    Colon Cancer Screen FIT/FOBT  05/12/2015    Diabetic microalbuminuria test  01/20/2016    Lipid screen  10/22/2016    Potassium monitoring  02/11/2019    Creatinine monitoring  02/11/2019    Diabetic foot exam  02/22/2019    A1C test (Diabetic or Prediabetic)  02/22/2019    Flu vaccine (Season Ended) 09/01/2019    Diabetic retinal exam  09/11/2019    Hepatitis C screen  Completed    HPV vaccine  Aged Out             (applicable per patient's age: Cancer Screenings, Depression Screening, Fall Risk Screening, Immunizations)    Hemoglobin A1C (%)   Date Value   02/22/2018 10.7   07/23/2015 9.1   01/20/2015 8.9 (H)     Microalb/Crt. Ratio (mcg/mg creat)   Date Value   01/20/2015 6,348     LDL Cholesterol (mg/dL)   Date Value   10/22/2015 105     AST (U/L)   Date Value   02/11/2018 14     ALT (U/L)   Date Value   02/11/2018 10     BUN (mg/dL)   Date Value   02/11/2018 45 (H)      (goal A1C is < 7)   (goal LDL is <100) need 30-50% reduction from baseline     BP Readings from Last 3 Encounters:   05/23/19 (!) 166/78   01/03/19 138/80   12/28/18 (!) 148/83    (goal /80)      All Future Testing planned in [facility]:  Lab Frequency Next Occurrence   Cardiac Stress Test - w/Pharm Once 06/06/2019       Next Visit Date:  No future appointments.          Patient Active Problem List:     Degeneration of lumbar or lumbosacral intervertebral disc     Impotence of organic origin     Hypercholesterolemia     Lung nodule     Pneumonia     Post PTCA     CAD (coronary artery disease)     Essential hypertension     Chronic systolic congestive heart failure ([facility] [address]

## 2019-06-02 NOTE — TELEPHONE ENCOUNTER
Pt would need to go through testing in cardiopulmonary dept to see if qualifies. If he wants to do this let me know. .. Otf Myles

## 2019-06-05 NOTE — PROGRESS NOTES
Amie Whitlock was seen on 6/5/2019 for   Chief Complaint   Patient presents with    Other     new patient for evaluation and treatment. Patient has a wound on right great toe. [place]                             REVIEW OF SYSTEMS    Constitutional Weight: present, Appetite: absent, Fatigue: present Fever: absent   HEENT Ears: normal,Visual disturbance: present Sore throat: absent, Glasses:present, Contacts:absent      Reespiratory Shortness of breath: present, Cough: absent absent;, Snoring: absent   Cardivascular Chest pain: absent, Palpitations: absent  Fainting: absent, Leg pain: present,Leg swelling:absent, Toe Discoloration: present, Non-healing wound:present Arm pain:present      GI Constipation: absent, Diarrhea: absent, Abdominal Pain: absent    Urinary frequency: absent, Urinary incontinence: absent   Musculoskeletal Neck pain: absent, Back pain: absent, Stiffness: absent, Muscle pain: absent, Joint pain: present, Restless Leg:absent     Dermatological Hair loss: absent, Skin changes: absent   Neurological  Sudden Loss of Vision in one eye:present, Slurred Speech:absent, Facial Droop:absent, Memory loss: absent, Balance Problem : present, Vertigo: absent, Weakness on one side of body: absent,Numbness: absent, Headache: absent   Psychiatric Anxiety: absent, Depression: absent   Hematologic Abnormal bleeding: absent, Clotting disorder: absent

## 2019-06-05 NOTE — PROGRESS NOTES
organization: Not on file     Attends meetings of clubs or organizations: Not on file     Relationship status: Not on file    Intimate partner violence:     Fear of current or ex partner: Not on file     Emotionally abused: Not on file     Physically abused: Not on file     Forced sexual activity: Not on file   Other Topics Concern    Not on file   Social History Narrative    Not on file     Family History   Problem Relation Age of Onset    Cancer Father         esophagus    Cancer Sister     Diabetes Other         family history     Past Medical History:   Diagnosis Date    Blood type B+     CAD (coronary artery disease)     CHF (congestive heart failure) ([facility] [address] 1/2013    Chronic kidney disease     Degeneration of lumbar or lumbosacral intervertebral disc     Dialysis patient ([facility] [address]     Hx of [facility]KA ([facility]     Rt BKA    Hyperlipidemia     Hypertension     Impotence     Kidney disease     on dialysis    Type II or unspecified type diabetes mellitus without mention of complication, not stated as uncontrolled      Current Outpatient Medications   Medication Sig Dispense Refill    prasugrel (EFFIENT) 10 MG TABS take 1 tablet by mouth daily 30 tablet 5    amLODIPine (NORVASC) 10 MG tablet take 1 tablet by mouth daily 30 tablet 5    lidocaine-prilocaine (EMLA) 2.5-2.5 % cream Apply topically 3 times per week before dialysis 30 g 5    insulin glargine (LANTUS) 100 UNIT/ML injection vial Inject 30 Units into the skin nightly He only takes if glucose over 200 1 vial 5    dicyclomine (BENTYL) 10 MG capsule Take 1 capsule by mouth 4 times daily 120 capsule 3    atorvastatin (LIPITOR) 10 MG tablet take 1 tablet by mouth daily 30 tablet 5    carvedilol (COREG) 25 MG tablet take 1 tablet by mouth daily 30 tablet 5    lisinopril (PRINIVIL;ZESTRIL) 20 MG tablet take 1 tablet by mouth daily 30 tablet 5    omeprazole (PRILOSEC) 20 MG delayed release capsule take 1 capsule by mouth once daily 30 capsule 5    sevelamer (RENVELA) 800 MG tablet Take 1 tablet by mouth 3 times daily (with meals)      hydrOXYzine (ATARAX) 25 MG tablet Take 1 tablet by mouth 3 times daily as needed for Itching 30 tablet 5    Glucose Blood (BLOOD GLUCOSE TEST STRIPS) STRP TEST 2 TIMES DAILY 200 strip 5    Insulin Syringe-Needle U-100 (BD INSULIN SYRINGE ULTRAFINE) 31G X 5/16\" 1 ML MISC Inject 2 times daily 200 each 3    glucose blood VI test strips (TRUETEST TEST) strip Please dispense True result  Use daily as needed 100 each 3    pantoprazole (PROTONIX) 40 MG tablet Take 40 mg by mouth daily      Aspirin-Acetaminophen-Caffeine (EXCEDRIN EXTRA STRENGTH PO) Take by mouth daily as needed      fluticasone (FLONASE) 50 MCG/ACT nasal spray 1 spray by Nasal route daily 1 Bottle 2    isosorbide mononitrate (IMDUR) 60 MG extended release tablet Take 1 tablet by mouth daily 30 tablet 5    insulin lispro (HUMALOG) 100 UNIT/ML injection vial Inject 10  U daily      metoclopramide (REGLAN) 5 MG tablet Take 5 mg by mouth daily       calcitRIOL (ROCALTROL) 0.5 MCG capsule Take 1 capsule by mouth daily 30 capsule 5    hydrALAZINE (APRESOLINE) 25 MG tablet Take 25 mg by mouth daily       sildenafil (VIAGRA) 50 MG tablet Take 1 tablet by mouth as needed for Erectile Dysfunction 20 tablet 2    B Complex-C-Folic Acid (RENAL) 1 MG CAPS Take 1 capsule by mouth daily as needed       nitroGLYCERIN (NITROSTAT) 0.4 MG SL tablet Place 1 tablet under the tongue every 5 minutes as needed for Chest pain 25 tablet 3    aspirin 81 MG tablet Take 81 mg by mouth daily.  Lancets MISC Check blood glucose 2 times daily-TRUE PLUS LANCETS- 200 each 5     No current facility-administered medications for this visit. Physical findings:  General- no acute distress, oriented  HEENT - no xanthelasma, external ears normal  Neck- Supple, no thyromegaly  Carotids - no carotid bruits  Lungs - Clear to auscultation.   CV- Regular rate and rythym, no murmurs rubs

## 2019-06-05 NOTE — PATIENT INSTRUCTIONS
SURVEY:    You may be receiving a survey from [employer] regarding your visit today. Please complete the survey to enable us to provide the highest quality of care to you and your family. If you cannot score us a very good on any question, please call the office to discuss how we could have made your experience a very good one. Thank you. Please recheck your blood pressure when you go home and make sure you take your prescribed medication if applicable . Please follow up with your PCP or ER if needed.

## 2019-06-11 NOTE — PROGRESS NOTES
Bandage to right groin access site saturated. Patient reports pain 10/10 in area. Small hematoma palpable at access site. Pressure applied for 10 minutes. Dr. Kitty Smith called and updated. Also updated on patient's increase in pain and request for additional pain medication. Dr. Kitty Smith modified fentanyl order to 25 mcg every 30 minutes as needed for pain. New bandaid applied after pressure held.

## 2019-06-11 NOTE — PLAN OF CARE
Dr. Timi Fried updated on oozing from site and patient's vitals. Dr. Timi Fried gave order for ok to discharge home.

## 2019-06-11 NOTE — PROCEDURES
[address], [place] [address]                            CARDIAC CATHETERIZATION    PATIENT NAME: Sacha Bolden                    :        1955  MED REC NO:   [id number]                              ROOM:  ACCOUNT NO:   [de-identified]                           ADMIT DATE: 2019  PROVIDER:     Chastity Garcia    FINAL IMPRESSION:  Successful angioplasty of the left posterior tibial  artery. PROCEDURE:  Access under ultrasound, placement of a catheter in the  abdominal aorta, abdominal aortography, bilateral lower extremity  angiography, left posterior tibial artery angioplasty. METHOD:  Written informed consent was obtained. The right common  femoral artery was identified under ultrasound and using micropuncture  technique a micropuncture introducer was placed. A 5-Yoruba sheath was  then introduced and a 5-Yoruba Omni Flush catheter advanced into the  abdominal aorta. Aortography was performed. Using a hydrophilic  guidewire, the aortic bifurcation was traversed and left lower extremity  angiography performed with a catheter tip at the common femoral artery. A guidewire exchange was then performed for a multipurpose catheter and  with the catheter tip in the popliteal artery additional left lower  extremity angiography was performed. The catheter and sheath were  withdrawn and replaced with a 6-Yoruba 110 cm sheath, which was placed  into the popliteal artery. Using this, a 14 hydrophilic wire was  introduced and advanced into the posterior tibial artery. Attempts to  pass the balloon into the distal artery were hindered due to the length  from the right groin sheath to the distal lesion in the posterior tibial  artery. A 2.5 mm, 20 cm balloon length catheter on a 150 cm shaft was  too short to reach the target lesion.   At this point, the guidewire  balloon and sheath were withdrawn and replaced with a 6-Yoruba 90 cm  sheath. At this point, the guidewire entered into the anterior tibial  artery and attempts were made to traverse an occlusion in this vessel. Those attempts were unsuccessful. The guidewire was then replaced and  advanced into the distal posterior tibial circulation and a 2.5 x 20 cm  balloon was introduced into the posterior tibial artery and angioplasty  performed throughout the distal one-half. Additional attempts were made  to traverse the anterior tibial occlusion with a variety of guidewires. Access was then gained using ultrasound into the distal anterior tibial  artery and a micropuncture introducer was placed. This was then  replaced by a 4-Chilean microcatheter and a V-18 control wire. Using  this catheter and a variety of wires, attempts to traverse the anterior  tibial occlusion in a retrograde fashion were attempted. These attempts  were unsuccessful. Multiple aliquots of nitroglycerin were administered  both through the sheath and through the distal access. At this point, a  strong Doppler pulse was confirmed in the left posterior tibial artery  and a decision was made to conclude the case at this point. The right  groin was carefully re-scrubbed using Hibiclens and a 6-Chilean  Angio-Seal deployed. The catheter was withdrawn from the anterior  tibial artery. Hemostasis was achieved. At the conclusion of the  procedure, the patient was hemodynamically stable and symptom-free. There was a strong Doppler signal in the left posterior tibial artery. There was good hemostasis at the right groin. ANGIOGRAPHY:    ABDOMINAL AORTOGRAPHY:  This vessel was calcified, but showed no  significant stenosis nor evidence of aneurysm    RIGHT LOWER EXTREMITY:  The vessels of the right lower extremity were  calcified, but otherwise unremarkable.   There appeared to be an adequate  lumen in the common internal and external iliac arteries, the common  femoral artery and the proximal profunda, and SFA.    LEFT LOWER EXTREMITY:  There was calcification present in all vessels in  the left leg. The common internal and external iliac arteries were free  of significant stenosis. The common femoral profunda and superficial  femoral were patent. The popliteal demonstrated plaque disease. The  anterior tibial was occluded in its proximal portion with multiple areas  of occlusion and dense calcification throughout the distal two-thirds. The tibioperoneal trunk was normal.  The peroneal was occluded. The  posterior tibial had multiple 90% stenoses in the distal one-half, which  were reduced to 0% after balloon angioplasty. There was a remaining  90-95% stenosis at the ankle, which was just beyond the reach of the 150  cm shaft balloon. The plantar circulation was intact. COMMENT:  The patient is a 72-year-old gentleman who presented with  ischemic tissue loss on the tip of his left great toe. He will be  maintained on aspirin and Effient. Should he not heal, it would be  appropriate to perform left femoral antegrade access and to treat the  most distal portion of the posterior tibial artery.         Pedro Woods    D: 06/11/2019 11:46:15       T: 06/11/2019 11:52:32     [id number]  Job#: [id number]     Doc#: [id number]    CC:  Kaylah Angulo

## 2019-06-11 NOTE — PLAN OF CARE
Writer reviewed discharge instructions with patient and son. Both verbalized understanding. Denies questions. Copy of discharge instructions given to patient.

## 2019-06-11 NOTE — PLAN OF CARE
Patient stood up at bedside. Complained of dizziness. Patient sat back on cart. Dressing to right groin checked. Yoly dressing covered in bloody drainage.   Patient ambulated to [facility] with two person assist.

## 2019-06-12 NOTE — INTERVAL H&P NOTE
H&P Update    Patient's History and Physical from June 4, 2019 was reviewed. Patient examined. There has been no change.     Jose David Bergeron

## 2019-06-12 NOTE — H&P
19 1st recent [facility] visit. Thinks that he was seen by vascular here in past. Lives in [facility]. PCP Rojas Terrazas who referred him to Dr Carol Trivedi for ulcer left great toe. DM for 23 years. Dialysis about 6 yrs. [facility]. Had R BKA couple years back. Ambulatory with prosthesis and crutch. Sees Dr Wade Garcia for heart. Has CHF. Hx of cor stents. Angio 3/20/13 showed tibial disease. Had intervention in [facility]. MRI 19 poss osteo great toe. Problems started left gt toe 1 month ago. Mild left foot neuropathy. Does not know if he ever took pletal. Takes asa, effient.      Social History               Socioeconomic History    Marital status: Single       Spouse name: Not on file    Number of children: Not on file    Years of education: Not on file    Highest education level: Not on file   Occupational History    Not on file   Social Needs    Financial resource strain: Not on file    Food insecurity:       Worry: Not on file       Inability: Not on file    Transportation needs:       Medical: Not on file       Non-medical: Not on file   Tobacco Use    Smoking status: Former Smoker       Packs/day: 1.00       Years: 2.00       Pack years: 2.00       Last attempt to quit: 2012       Years since quittin.8    Smokeless tobacco: Former User       Quit date: 2012   Substance and Sexual Activity    Alcohol use: No       Alcohol/week: 0.0 oz    Drug use:  No       Comment: when young    Sexual activity: Not on file   Lifestyle    Physical activity:       Days per week: Not on file       Minutes per session: Not on file    Stress: Not on file   Relationships    Social connections:       Talks on phone: Not on file       Gets together: Not on file       Attends Synagogue service: Not on file       Active member of club or organization: Not on file       Attends meetings of clubs or organizations: Not on file       Relationship status: Not on file    Intimate partner violence:       Fear of current or ex partner: Not on file       Emotionally abused: Not on file       Physically abused: Not on file       Forced sexual activity: Not on file   Other Topics Concern    Not on file   Social History Narrative    Not on file         Family History         Family History   Problem Relation Age of Onset    Cancer Father           esophagus    Cancer Sister      Diabetes Other           family history         Past Medical History        Past Medical History:   Diagnosis Date    Blood type B+      CAD (coronary artery disease)      CHF (congestive heart failure) ([facility] 1/2013    Chronic kidney disease      Degeneration of lumbar or lumbosacral intervertebral disc      Dialysis patient ([facility]      Hx of BKA ([facility]       Rt BKA    Hyperlipidemia      Hypertension      Impotence      Kidney disease       on dialysis    Type II or unspecified type diabetes mellitus without mention of complication, not stated as uncontrolled           Current Facility-Administered Medications          Current Outpatient Medications   Medication Sig Dispense Refill    prasugrel (EFFIENT) 10 MG TABS take 1 tablet by mouth daily 30 tablet 5    amLODIPine (NORVASC) 10 MG tablet take 1 tablet by mouth daily 30 tablet 5    lidocaine-prilocaine (EMLA) 2.5-2.5 % cream Apply topically 3 times per week before dialysis 30 g 5    insulin glargine (LANTUS) 100 UNIT/ML injection vial Inject 30 Units into the skin nightly He only takes if glucose over 200 1 vial 5    dicyclomine (BENTYL) 10 MG capsule Take 1 capsule by mouth 4 times daily 120 capsule 3    atorvastatin (LIPITOR) 10 MG tablet take 1 tablet by mouth daily 30 tablet 5    carvedilol (COREG) 25 MG tablet take 1 tablet by mouth daily 30 tablet 5    lisinopril (PRINIVIL;ZESTRIL) 20 MG tablet take 1 tablet by mouth daily 30 tablet 5    omeprazole (PRILOSEC) 20 MG delayed release capsule take 1 capsule by mouth once daily 30 capsule 5    sevelamer (RENVELA) 800 MG tablet Take 1 tablet by mouth 3 times daily (with meals)        hydrOXYzine (ATARAX) 25 MG tablet Take 1 tablet by mouth 3 times daily as needed for Itching 30 tablet 5    Glucose Blood (BLOOD GLUCOSE TEST STRIPS) STRP TEST 2 TIMES DAILY 200 strip 5    Insulin Syringe-Needle U-100 (BD INSULIN SYRINGE ULTRAFINE) 31G X 5/16\" 1 ML MISC Inject 2 times daily 200 each 3    glucose blood VI test strips (TRUETEST TEST) strip Please dispense True result  Use daily as needed 100 each 3    pantoprazole (PROTONIX) 40 MG tablet Take 40 mg by mouth daily        Aspirin-Acetaminophen-Caffeine (EXCEDRIN EXTRA STRENGTH PO) Take by mouth daily as needed        fluticasone (FLONASE) 50 MCG/ACT nasal spray 1 spray by Nasal route daily 1 Bottle 2    isosorbide mononitrate (IMDUR) 60 MG extended release tablet Take 1 tablet by mouth daily 30 tablet 5    insulin lispro (HUMALOG) 100 UNIT/ML injection vial Inject 10  U daily        metoclopramide (REGLAN) 5 MG tablet Take 5 mg by mouth daily         calcitRIOL (ROCALTROL) 0.5 MCG capsule Take 1 capsule by mouth daily 30 capsule 5    hydrALAZINE (APRESOLINE) 25 MG tablet Take 25 mg by mouth daily         sildenafil (VIAGRA) 50 MG tablet Take 1 tablet by mouth as needed for Erectile Dysfunction 20 tablet 2    B Complex-C-Folic Acid (RENAL) 1 MG CAPS Take 1 capsule by mouth daily as needed         nitroGLYCERIN (NITROSTAT) 0.4 MG SL tablet Place 1 tablet under the tongue every 5 minutes as needed for Chest pain 25 tablet 3    aspirin 81 MG tablet Take 81 mg by mouth daily.        Lancets MISC Check blood glucose 2 times daily-TRUE PLUS LANCETS- 200 each 5      No current facility-administered medications for this visit.                Physical findings:  General- no acute distress, oriented  HEENT - no xanthelasma, external ears normal  Neck- Supple, no thyromegaly  Carotids - no carotid bruits  Lungs - Clear to auscultation.   CV- Regular rate and

## 2019-07-01 NOTE — ED PROVIDER NOTES
1. Complication of arteriovenous dialysis fistula, initial encounter               (Please note that portions of this note were completed with a voice recognition program.  Efforts were made to edit the dictations but occasionally words are mis-transcribed.)        Marion Avendano MD  07/01/19 [id number]

## 2019-07-10 NOTE — PROGRESS NOTES
take 1 tablet by mouth daily 30 tablet 5    lidocaine-prilocaine (EMLA) 2.5-2.5 % cream Apply topically 3 times per week before dialysis 30 g 5    insulin glargine (LANTUS) 100 UNIT/ML injection vial Inject 30 Units into the skin nightly He only takes if glucose over 200 1 vial 5    dicyclomine (BENTYL) 10 MG capsule Take 1 capsule by mouth 4 times daily (Patient taking differently: Take 10 mg by mouth daily ) 120 capsule 3    atorvastatin (LIPITOR) 10 MG tablet take 1 tablet by mouth daily 30 tablet 5    carvedilol (COREG) 25 MG tablet take 1 tablet by mouth daily 30 tablet 5    lisinopril (PRINIVIL;ZESTRIL) 20 MG tablet take 1 tablet by mouth daily 30 tablet 5    omeprazole (PRILOSEC) 20 MG delayed release capsule take 1 capsule by mouth once daily 30 capsule 5    pantoprazole (PROTONIX) 40 MG tablet Take 40 mg by mouth daily      Aspirin-Acetaminophen-Caffeine (EXCEDRIN EXTRA STRENGTH PO) Take by mouth daily as needed      fluticasone (FLONASE) 50 MCG/ACT nasal spray 1 spray by Nasal route daily (Patient taking differently: 1 spray by Nasal route daily as needed for Rhinitis or Allergies ) 1 Bottle 2    metoclopramide (REGLAN) 5 MG tablet Take 5 mg by mouth daily       calcitRIOL (ROCALTROL) 0.5 MCG capsule Take 1 capsule by mouth daily 30 capsule 5    B Complex-C-Folic Acid (RENAL) 1 MG CAPS Take 1 capsule by mouth daily as needed       aspirin 81 MG tablet Take 81 mg by mouth daily.       sevelamer (RENVELA) 800 MG tablet Take 1 tablet by mouth 3 times daily (with meals)      hydrOXYzine (ATARAX) 25 MG tablet Take 1 tablet by mouth 3 times daily as needed for Itching 30 tablet 5    Glucose Blood (BLOOD GLUCOSE TEST STRIPS) STRP TEST 2 TIMES DAILY 200 strip 5    Insulin Syringe-Needle U-100 (BD INSULIN SYRINGE ULTRAFINE) 31G X 5/16\" 1 ML MISC Inject 2 times daily 200 each 3    glucose blood VI test strips (TRUETEST TEST) strip Please dispense True result  Use daily as needed 100 each 3   